# Patient Record
Sex: FEMALE | Race: OTHER | HISPANIC OR LATINO | URBAN - METROPOLITAN AREA
[De-identification: names, ages, dates, MRNs, and addresses within clinical notes are randomized per-mention and may not be internally consistent; named-entity substitution may affect disease eponyms.]

---

## 2017-04-06 ENCOUNTER — INPATIENT (INPATIENT)
Facility: HOSPITAL | Age: 82
LOS: 3 days | Discharge: HOME | DRG: 193 | End: 2017-04-10
Attending: INTERNAL MEDICINE | Admitting: INTERNAL MEDICINE
Payer: MEDICAID

## 2017-04-06 VITALS
WEIGHT: 115.08 LBS | TEMPERATURE: 98 F | HEIGHT: 63 IN | OXYGEN SATURATION: 77 % | RESPIRATION RATE: 20 BRPM | DIASTOLIC BLOOD PRESSURE: 52 MMHG | SYSTOLIC BLOOD PRESSURE: 138 MMHG | HEART RATE: 77 BPM

## 2017-04-06 DIAGNOSIS — J18.9 PNEUMONIA, UNSPECIFIED ORGANISM: ICD-10-CM

## 2017-04-06 DIAGNOSIS — E78.00 PURE HYPERCHOLESTEROLEMIA, UNSPECIFIED: ICD-10-CM

## 2017-04-06 DIAGNOSIS — Z41.8 ENCOUNTER FOR OTHER PROCEDURES FOR PURPOSES OTHER THAN REMEDYING HEALTH STATE: ICD-10-CM

## 2017-04-06 DIAGNOSIS — I10 ESSENTIAL (PRIMARY) HYPERTENSION: ICD-10-CM

## 2017-04-06 LAB
ALBUMIN SERPL ELPH-MCNC: 2 G/DL — LOW (ref 3.5–5)
ALP SERPL-CCNC: 114 U/L — SIGNIFICANT CHANGE UP (ref 40–120)
ALT FLD-CCNC: 55 U/L DA — SIGNIFICANT CHANGE UP (ref 10–60)
ANION GAP SERPL CALC-SCNC: 9 MMOL/L — SIGNIFICANT CHANGE UP (ref 5–17)
APTT BLD: 29.8 SEC — SIGNIFICANT CHANGE UP (ref 27.5–37.4)
AST SERPL-CCNC: 73 U/L — HIGH (ref 10–40)
BILIRUB SERPL-MCNC: 0.3 MG/DL — SIGNIFICANT CHANGE UP (ref 0.2–1.2)
BUN SERPL-MCNC: 17 MG/DL — SIGNIFICANT CHANGE UP (ref 7–18)
CALCIUM SERPL-MCNC: 8 MG/DL — LOW (ref 8.4–10.5)
CHLORIDE SERPL-SCNC: 101 MMOL/L — SIGNIFICANT CHANGE UP (ref 96–108)
CK MB BLD-MCNC: 1.9 % — SIGNIFICANT CHANGE UP (ref 0–3.5)
CK MB CFR SERPL CALC: 1.2 NG/ML — SIGNIFICANT CHANGE UP (ref 0–3.6)
CK SERPL-CCNC: 63 U/L — SIGNIFICANT CHANGE UP (ref 21–215)
CO2 SERPL-SCNC: 24 MMOL/L — SIGNIFICANT CHANGE UP (ref 22–31)
CREAT SERPL-MCNC: 0.94 MG/DL — SIGNIFICANT CHANGE UP (ref 0.5–1.3)
D DIMER BLD IA.RAPID-MCNC: 5187 NG/ML DDU — HIGH
GLUCOSE SERPL-MCNC: 123 MG/DL — HIGH (ref 70–99)
HCT VFR BLD CALC: 30.5 % — LOW (ref 34.5–45)
HGB BLD-MCNC: 10.3 G/DL — LOW (ref 11.5–15.5)
INR BLD: 1.65 RATIO — HIGH (ref 0.88–1.16)
LACTATE SERPL-SCNC: 2.1 MMOL/L — HIGH (ref 0.7–2)
LYMPHOCYTES # BLD AUTO: 6 % — LOW (ref 13–44)
MANUAL DIF COMMENT BLD-IMP: SIGNIFICANT CHANGE UP
MCHC RBC-ENTMCNC: 31.8 PG — SIGNIFICANT CHANGE UP (ref 27–34)
MCHC RBC-ENTMCNC: 33.8 GM/DL — SIGNIFICANT CHANGE UP (ref 32–36)
MCV RBC AUTO: 93.9 FL — SIGNIFICANT CHANGE UP (ref 80–100)
MONOCYTES NFR BLD AUTO: 8 % — SIGNIFICANT CHANGE UP (ref 2–14)
NEUTROPHILS NFR BLD AUTO: 82 % — HIGH (ref 43–77)
NEUTS BAND # BLD: 1 % — SIGNIFICANT CHANGE UP (ref 0–8)
NT-PROBNP SERPL-SCNC: 1543 PG/ML — HIGH (ref 0–450)
PLAT MORPH BLD: NORMAL — SIGNIFICANT CHANGE UP
PLATELET # BLD AUTO: 440 K/UL — HIGH (ref 150–400)
POLYCHROMASIA BLD QL SMEAR: SLIGHT — SIGNIFICANT CHANGE UP
POTASSIUM SERPL-MCNC: 4.5 MMOL/L — SIGNIFICANT CHANGE UP (ref 3.5–5.3)
POTASSIUM SERPL-SCNC: 4.5 MMOL/L — SIGNIFICANT CHANGE UP (ref 3.5–5.3)
PROT SERPL-MCNC: 6.4 G/DL — SIGNIFICANT CHANGE UP (ref 6–8.3)
PROTHROM AB SERPL-ACNC: 18.2 SEC — HIGH (ref 9.8–12.7)
RBC # BLD: 3.25 M/UL — LOW (ref 3.8–5.2)
RBC # FLD: 13.3 % — SIGNIFICANT CHANGE UP (ref 10.3–14.5)
RBC BLD AUTO: ABNORMAL
SODIUM SERPL-SCNC: 134 MMOL/L — LOW (ref 135–145)
TROPONIN I SERPL-MCNC: 0.02 NG/ML — SIGNIFICANT CHANGE UP (ref 0–0.04)
VARIANT LYMPHS # BLD: 3 % — SIGNIFICANT CHANGE UP (ref 0–6)
WBC # BLD: 19.5 K/UL — HIGH (ref 3.8–10.5)
WBC # FLD AUTO: 19.5 K/UL — HIGH (ref 3.8–10.5)

## 2017-04-06 PROCEDURE — 99291 CRITICAL CARE FIRST HOUR: CPT

## 2017-04-06 PROCEDURE — 71010: CPT | Mod: 26

## 2017-04-06 RX ORDER — AZITHROMYCIN 500 MG/1
500 TABLET, FILM COATED ORAL EVERY 24 HOURS
Qty: 0 | Refills: 0 | Status: DISCONTINUED | OUTPATIENT
Start: 2017-04-07 | End: 2017-04-10

## 2017-04-06 RX ORDER — SODIUM CHLORIDE 9 MG/ML
1000 INJECTION INTRAMUSCULAR; INTRAVENOUS; SUBCUTANEOUS
Qty: 0 | Refills: 0 | Status: DISCONTINUED | OUTPATIENT
Start: 2017-04-06 | End: 2017-04-07

## 2017-04-06 RX ORDER — SODIUM CHLORIDE 9 MG/ML
1000 INJECTION INTRAMUSCULAR; INTRAVENOUS; SUBCUTANEOUS
Qty: 0 | Refills: 0 | Status: DISCONTINUED | OUTPATIENT
Start: 2017-04-06 | End: 2017-04-06

## 2017-04-06 RX ORDER — ASPIRIN/CALCIUM CARB/MAGNESIUM 324 MG
81 TABLET ORAL DAILY
Qty: 0 | Refills: 0 | Status: DISCONTINUED | OUTPATIENT
Start: 2017-04-06 | End: 2017-04-10

## 2017-04-06 RX ORDER — ENOXAPARIN SODIUM 100 MG/ML
40 INJECTION SUBCUTANEOUS DAILY
Qty: 0 | Refills: 0 | Status: DISCONTINUED | OUTPATIENT
Start: 2017-04-06 | End: 2017-04-10

## 2017-04-06 RX ORDER — IPRATROPIUM BROMIDE 0.2 MG/ML
500 SOLUTION, NON-ORAL INHALATION
Qty: 0 | Refills: 0 | Status: COMPLETED | OUTPATIENT
Start: 2017-04-06 | End: 2017-04-06

## 2017-04-06 RX ORDER — CEFTRIAXONE 500 MG/1
1 INJECTION, POWDER, FOR SOLUTION INTRAMUSCULAR; INTRAVENOUS ONCE
Qty: 0 | Refills: 0 | Status: COMPLETED | OUTPATIENT
Start: 2017-04-06 | End: 2017-04-06

## 2017-04-06 RX ORDER — ATORVASTATIN CALCIUM 80 MG/1
80 TABLET, FILM COATED ORAL AT BEDTIME
Qty: 0 | Refills: 0 | Status: DISCONTINUED | OUTPATIENT
Start: 2017-04-06 | End: 2017-04-10

## 2017-04-06 RX ORDER — AZITHROMYCIN 500 MG/1
500 TABLET, FILM COATED ORAL ONCE
Qty: 0 | Refills: 0 | Status: COMPLETED | OUTPATIENT
Start: 2017-04-06 | End: 2017-04-06

## 2017-04-06 RX ORDER — ALBUTEROL 90 UG/1
2.5 AEROSOL, METERED ORAL
Qty: 0 | Refills: 0 | Status: COMPLETED | OUTPATIENT
Start: 2017-04-06 | End: 2017-04-06

## 2017-04-06 RX ORDER — CEFTRIAXONE 500 MG/1
1 INJECTION, POWDER, FOR SOLUTION INTRAMUSCULAR; INTRAVENOUS EVERY 24 HOURS
Qty: 0 | Refills: 0 | Status: DISCONTINUED | OUTPATIENT
Start: 2017-04-07 | End: 2017-04-10

## 2017-04-06 RX ADMIN — SODIUM CHLORIDE 500 MILLILITER(S): 9 INJECTION INTRAMUSCULAR; INTRAVENOUS; SUBCUTANEOUS at 20:43

## 2017-04-06 RX ADMIN — Medication 500 MICROGRAM(S): at 20:30

## 2017-04-06 RX ADMIN — ALBUTEROL 2.5 MILLIGRAM(S): 90 AEROSOL, METERED ORAL at 20:10

## 2017-04-06 RX ADMIN — ALBUTEROL 2.5 MILLIGRAM(S): 90 AEROSOL, METERED ORAL at 20:00

## 2017-04-06 RX ADMIN — ALBUTEROL 2.5 MILLIGRAM(S): 90 AEROSOL, METERED ORAL at 20:35

## 2017-04-06 RX ADMIN — Medication 500 MICROGRAM(S): at 20:10

## 2017-04-06 RX ADMIN — CEFTRIAXONE 100 GRAM(S): 500 INJECTION, POWDER, FOR SOLUTION INTRAMUSCULAR; INTRAVENOUS at 20:43

## 2017-04-06 RX ADMIN — Medication 500 MICROGRAM(S): at 20:00

## 2017-04-06 RX ADMIN — AZITHROMYCIN 250 MILLIGRAM(S): 500 TABLET, FILM COATED ORAL at 20:35

## 2017-04-06 RX ADMIN — Medication 125 MILLIGRAM(S): at 20:12

## 2017-04-06 NOTE — ED PROVIDER NOTE - NS ED MD SCRIBE ATTENDING SCRIBE SECTIONS
HIV/DISPOSITION/PAST MEDICAL/SURGICAL/SOCIAL HISTORY/HISTORY OF PRESENT ILLNESS/VITAL SIGNS( Pullset)/PHYSICAL EXAM/REVIEW OF SYSTEMS

## 2017-04-06 NOTE — H&P ADULT. - PROBLEM SELECTOR PLAN 2
Patient takes lisinopril 20mg Bid and Amlodipine 5mg Qd at home  will give lisinopril 20mg Qd for now as SBP is around 120  Monitor BP and will adjust the med accordingly

## 2017-04-06 NOTE — ED PROVIDER NOTE - PROGRESS NOTE DETAILS
Spoke w Dr Jeong, agree w admisison to ICU. Pt still tachypneic to 35-40, N3eqn-18% on NRB. Will admit

## 2017-04-06 NOTE — H&P ADULT. - PROBLEM SELECTOR PLAN 1
likely post viral PNA  CXR showed RLL infiltrate  Patient is afebrile and leucocytosis of 19.5  Continue Zithromax and Rocephin  Continue mild IV hydration  Will give high flow oxygen for SOB and hypoxia and will get ABG  will get RVP  f/up blood cultures  Will admit patient to ICU for monitoring and impending respiratory failure

## 2017-04-06 NOTE — ED PROVIDER NOTE - OBJECTIVE STATEMENT
80 y/o female with PMHx of Emphysema, LBBB, HLD, and HTN presents to the ED c/o difficulty breathing today with blood tinged sputum today. Granddaughter reports she took pt to urgent care 1 week ago and was Dx with flu by flu swab. The next day, pt was coughing blood and was seen at [facility], CXR indicated PNA. PMD Rx 5 days of Levofloxacin 750, finished 2 days ago. Since yesterday, pt with fatigue, pale, and hand swelling. Pt denies fever, chills, CP, or any other complaints. Allergies: Lopid (Hives). PMD: Dr. Agosto ([place]).

## 2017-04-06 NOTE — ED PROVIDER NOTE - MEDICAL DECISION MAKING DETAILS
Pt with cough x 1 week, failed outpt treatment. Possible emphysema lung fibrosis. Pt still hypoxic to 92-94, tachypneic on exam. Plan to r/o cardiac, will get CTA r/o PE vs infection. Will treat then admit.

## 2017-04-06 NOTE — ED PROVIDER NOTE - CRITICAL CARE PROVIDED
additional history taking/consult w/ pt's family directly relating to pts condition/telephone consultation with the patient's family/interpretation of diagnostic studies/documentation/direct patient care (not related to procedure)/consultation with other physicians

## 2017-04-06 NOTE — H&P ADULT. - HISTORY OF PRESENT ILLNESS
80 yo F from home with PMH of HTN, HLD, LBBB and recently diagnosed Emphysema presented with cough and shortness of breath. Patient is AAOx3 and [place] speaker and translated through grand daughter at the bedside. As per the patient and family member, patient started to have cough last wednesday and went to urgent care 82 yo F from home with PMH of HTN, HLD, LBBB and recently diagnosed Emphysema presented with cough and shortness of breath. Patient is AAOx3 and Singaporean speaker and translated through grand daughter at the bedside. As per the patient and family member, patient started to have cough last wednesday and went to urgent care and diagnosed as Flu, but because of her symptom was more than 48hrs, they didn't give her any flu treatment at that time. The next day, she went to ED for cough and hemoptysis, diagnosed as pneumonia and prescribed her levaquin for 5 days and she completed the abx 2 days ago. Today, she has cough with pink color sputum and shortness of breath and decided to come here. She complains about nausea but no vomiting. She has mild abdominal pain when she coughs. Denied other complaints.   In ED, patient is found to have tachypneic, hypoxic and ICU is consulted. 82 yo F from home with PMH of HTN, HLD, LBBB and recently diagnosed Emphysema presented with cough and shortness of breath. Patient is AAOx3 and Jordanian speaker and translated through grand daughter at the bedside. As per the patient and family member, patient started to have cough last wednesday and went to urgent care and diagnosed as Flu, but because of her symptom was more than 48hrs, they didn't give her any flu treatment at that time. The next day, she went to ED for cough and hemoptysis, diagnosed as pneumonia and prescribed her levaquin for 5 days and she completed the abx 2 days ago. Today, she has cough with pink color sputum and shortness of breath and decided to come here. She complains about nausea but no vomiting. She has mild abdominal pain when she coughs. Denied other complaints.   In ED, patient is found to have tachypneic, hypoxic and ICU is consulted.

## 2017-04-06 NOTE — H&P ADULT. - ASSESSMENT
80 yo F from home with PMH of HTN, HLD, LBBB and recently diagnosed Emphysema presented with cough and shortness of breath. Patient is AAOx3 and Tunisian speaker and translated through grand daughter at the bedside. As per the patient and family member, patient started to have cough last wednesday and went to urgent care and diagnosed as Flu, but because of her symptom was more than 48hrs, they didn't give her any flu treatment at that time. The next day, she went to ED for cough and hemoptysis, diagnosed as pneumonia and prescribed her levaquin for 5 days and she completed the abx 2 days ago. Today, she has cough with pink color sputum and shortness of breath and decided to come here. She complains about nausea but no vomiting. She has mild abdominal pain when she coughs. Denied other complaints.   In ED, patient is found to have tachypneic, hypoxic and ICU is consulted.

## 2017-04-07 LAB
24R-OH-CALCIDIOL SERPL-MCNC: 29.2 NG/ML — LOW (ref 30–100)
ALBUMIN SERPL ELPH-MCNC: 1.8 G/DL — LOW (ref 3.5–5)
ALP SERPL-CCNC: 95 U/L — SIGNIFICANT CHANGE UP (ref 40–120)
ALT FLD-CCNC: 44 U/L DA — SIGNIFICANT CHANGE UP (ref 10–60)
ANION GAP SERPL CALC-SCNC: 12 MMOL/L — SIGNIFICANT CHANGE UP (ref 5–17)
AST SERPL-CCNC: 52 U/L — HIGH (ref 10–40)
BASE EXCESS BLDA CALC-SCNC: -3.1 MMOL/L — LOW (ref -2–2)
BASOPHILS # BLD AUTO: 0 K/UL — SIGNIFICANT CHANGE UP (ref 0–0.2)
BASOPHILS NFR BLD AUTO: 0.1 % — SIGNIFICANT CHANGE UP (ref 0–2)
BILIRUB SERPL-MCNC: 0.2 MG/DL — SIGNIFICANT CHANGE UP (ref 0.2–1.2)
BLOOD GAS COMMENTS ARTERIAL: SIGNIFICANT CHANGE UP
BUN SERPL-MCNC: 13 MG/DL — SIGNIFICANT CHANGE UP (ref 7–18)
CALCIUM SERPL-MCNC: 7.8 MG/DL — LOW (ref 8.4–10.5)
CHLORIDE SERPL-SCNC: 102 MMOL/L — SIGNIFICANT CHANGE UP (ref 96–108)
CHOLEST SERPL-MCNC: 59 MG/DL — SIGNIFICANT CHANGE UP (ref 10–199)
CO2 SERPL-SCNC: 19 MMOL/L — LOW (ref 22–31)
CREAT SERPL-MCNC: 0.8 MG/DL — SIGNIFICANT CHANGE UP (ref 0.5–1.3)
EOSINOPHIL # BLD AUTO: 0 K/UL — SIGNIFICANT CHANGE UP (ref 0–0.5)
EOSINOPHIL NFR BLD AUTO: 0 % — SIGNIFICANT CHANGE UP (ref 0–6)
GLUCOSE SERPL-MCNC: 187 MG/DL — HIGH (ref 70–99)
HBA1C BLD-MCNC: 5.9 % — HIGH (ref 4–5.6)
HCO3 BLDA-SCNC: 19 MMOL/L — LOW (ref 23–27)
HCT VFR BLD CALC: 28.3 % — LOW (ref 34.5–45)
HDLC SERPL-MCNC: 14 MG/DL — LOW (ref 40–125)
HGB BLD-MCNC: 9.4 G/DL — LOW (ref 11.5–15.5)
HOROWITZ INDEX BLDA+IHG-RTO: 21 — SIGNIFICANT CHANGE UP
LEGIONELLA AG UR QL: NEGATIVE — SIGNIFICANT CHANGE UP
LIPID PNL WITH DIRECT LDL SERPL: 38 MG/DL — SIGNIFICANT CHANGE UP
LYMPHOCYTES # BLD AUTO: 0.3 K/UL — LOW (ref 1–3.3)
LYMPHOCYTES # BLD AUTO: 1.5 % — LOW (ref 13–44)
MAGNESIUM SERPL-MCNC: 2.4 MG/DL — SIGNIFICANT CHANGE UP (ref 1.8–2.4)
MCHC RBC-ENTMCNC: 31.5 PG — SIGNIFICANT CHANGE UP (ref 27–34)
MCHC RBC-ENTMCNC: 33.3 GM/DL — SIGNIFICANT CHANGE UP (ref 32–36)
MCV RBC AUTO: 94.6 FL — SIGNIFICANT CHANGE UP (ref 80–100)
MONOCYTES # BLD AUTO: 0.4 K/UL — SIGNIFICANT CHANGE UP (ref 0–0.9)
MONOCYTES NFR BLD AUTO: 1.8 % — LOW (ref 2–14)
NEUTROPHILS # BLD AUTO: 18.8 K/UL — HIGH (ref 1.8–7.4)
NEUTROPHILS NFR BLD AUTO: 96.6 % — HIGH (ref 43–77)
PCO2 BLDA: 25 MMHG — LOW (ref 32–46)
PH BLDA: 7.5 — HIGH (ref 7.35–7.45)
PHOSPHATE SERPL-MCNC: 2.5 MG/DL — SIGNIFICANT CHANGE UP (ref 2.5–4.5)
PLATELET # BLD AUTO: 428 K/UL — HIGH (ref 150–400)
PO2 BLDA: 159 MMHG — HIGH (ref 74–108)
POTASSIUM SERPL-MCNC: 4.3 MMOL/L — SIGNIFICANT CHANGE UP (ref 3.5–5.3)
POTASSIUM SERPL-SCNC: 4.3 MMOL/L — SIGNIFICANT CHANGE UP (ref 3.5–5.3)
PROT SERPL-MCNC: 5.8 G/DL — LOW (ref 6–8.3)
RAPID RVP RESULT: SIGNIFICANT CHANGE UP
RBC # BLD: 2.99 M/UL — LOW (ref 3.8–5.2)
RBC # FLD: 13.1 % — SIGNIFICANT CHANGE UP (ref 10.3–14.5)
SAO2 % BLDA: 99 % — HIGH (ref 92–96)
SODIUM SERPL-SCNC: 133 MMOL/L — LOW (ref 135–145)
TOTAL CHOLESTEROL/HDL RATIO MEASUREMENT: 4.2 RATIO — SIGNIFICANT CHANGE UP (ref 3.3–7.1)
TRIGL SERPL-MCNC: 36 MG/DL — SIGNIFICANT CHANGE UP (ref 10–149)
TSH SERPL-MCNC: 0.29 UU/ML — LOW (ref 0.34–4.82)
WBC # BLD: 19.5 K/UL — HIGH (ref 3.8–10.5)
WBC # FLD AUTO: 19.5 K/UL — HIGH (ref 3.8–10.5)

## 2017-04-07 PROCEDURE — 71010: CPT | Mod: 26

## 2017-04-07 RX ORDER — PANTOPRAZOLE SODIUM 20 MG/1
40 TABLET, DELAYED RELEASE ORAL
Qty: 0 | Refills: 0 | Status: DISCONTINUED | OUTPATIENT
Start: 2017-04-07 | End: 2017-04-10

## 2017-04-07 RX ORDER — IPRATROPIUM/ALBUTEROL SULFATE 18-103MCG
3 AEROSOL WITH ADAPTER (GRAM) INHALATION EVERY 6 HOURS
Qty: 0 | Refills: 0 | Status: DISCONTINUED | OUTPATIENT
Start: 2017-04-07 | End: 2017-04-10

## 2017-04-07 RX ADMIN — Medication 3 MILLILITER(S): at 09:30

## 2017-04-07 RX ADMIN — Medication 40 MILLIGRAM(S): at 20:54

## 2017-04-07 RX ADMIN — SODIUM CHLORIDE 75 MILLILITER(S): 9 INJECTION INTRAMUSCULAR; INTRAVENOUS; SUBCUTANEOUS at 01:30

## 2017-04-07 RX ADMIN — AZITHROMYCIN 250 MILLIGRAM(S): 500 TABLET, FILM COATED ORAL at 20:43

## 2017-04-07 RX ADMIN — Medication 20 MILLIGRAM(S): at 06:15

## 2017-04-07 RX ADMIN — Medication 40 MILLIGRAM(S): at 14:56

## 2017-04-07 RX ADMIN — Medication 3 MILLILITER(S): at 15:05

## 2017-04-07 RX ADMIN — CEFTRIAXONE 100 GRAM(S): 500 INJECTION, POWDER, FOR SOLUTION INTRAMUSCULAR; INTRAVENOUS at 20:13

## 2017-04-07 RX ADMIN — ATORVASTATIN CALCIUM 80 MILLIGRAM(S): 80 TABLET, FILM COATED ORAL at 20:54

## 2017-04-07 RX ADMIN — ENOXAPARIN SODIUM 40 MILLIGRAM(S): 100 INJECTION SUBCUTANEOUS at 11:49

## 2017-04-07 RX ADMIN — Medication 3 MILLILITER(S): at 20:24

## 2017-04-07 RX ADMIN — Medication 81 MILLIGRAM(S): at 11:49

## 2017-04-07 NOTE — ED ADULT NURSE NOTE - ED STAT RN HANDOFF DETAILS
pt.alert and  oriented x2. on % with 02 sat 93% ori lyle aware.admitted  to ICU for pneumonia of both lungs.transfer to ICU  report given to kary roberto.

## 2017-04-07 NOTE — PATIENT PROFILE ADULT. - ABILITY TO HEAR (WITH HEARING AID OR HEARING APPLIANCE IF NORMALLY USED):
Mildly to Moderately Impaired: difficulty hearing in some environments or speaker may need to increase volume or speak distinctly/uses left hearing aid she left at home

## 2017-04-08 LAB
ALBUMIN SERPL ELPH-MCNC: 1.8 G/DL — LOW (ref 3.5–5)
ALP SERPL-CCNC: 102 U/L — SIGNIFICANT CHANGE UP (ref 40–120)
ALT FLD-CCNC: 69 U/L DA — HIGH (ref 10–60)
ANION GAP SERPL CALC-SCNC: 11 MMOL/L — SIGNIFICANT CHANGE UP (ref 5–17)
AST SERPL-CCNC: 88 U/L — HIGH (ref 10–40)
BILIRUB SERPL-MCNC: 0.2 MG/DL — SIGNIFICANT CHANGE UP (ref 0.2–1.2)
BUN SERPL-MCNC: 20 MG/DL — HIGH (ref 7–18)
CALCIUM SERPL-MCNC: 7.9 MG/DL — LOW (ref 8.4–10.5)
CHLORIDE SERPL-SCNC: 103 MMOL/L — SIGNIFICANT CHANGE UP (ref 96–108)
CO2 SERPL-SCNC: 22 MMOL/L — SIGNIFICANT CHANGE UP (ref 22–31)
CREAT SERPL-MCNC: 0.75 MG/DL — SIGNIFICANT CHANGE UP (ref 0.5–1.3)
GLUCOSE SERPL-MCNC: 122 MG/DL — HIGH (ref 70–99)
HCT VFR BLD CALC: 29.6 % — LOW (ref 34.5–45)
HGB BLD-MCNC: 10.1 G/DL — LOW (ref 11.5–15.5)
MAGNESIUM SERPL-MCNC: 2.5 MG/DL — HIGH (ref 1.8–2.4)
MCHC RBC-ENTMCNC: 32.1 PG — SIGNIFICANT CHANGE UP (ref 27–34)
MCHC RBC-ENTMCNC: 34 GM/DL — SIGNIFICANT CHANGE UP (ref 32–36)
MCV RBC AUTO: 94.2 FL — SIGNIFICANT CHANGE UP (ref 80–100)
PHOSPHATE SERPL-MCNC: 3.4 MG/DL — SIGNIFICANT CHANGE UP (ref 2.5–4.5)
PLATELET # BLD AUTO: 547 K/UL — HIGH (ref 150–400)
POTASSIUM SERPL-MCNC: 4.2 MMOL/L — SIGNIFICANT CHANGE UP (ref 3.5–5.3)
POTASSIUM SERPL-SCNC: 4.2 MMOL/L — SIGNIFICANT CHANGE UP (ref 3.5–5.3)
PROT SERPL-MCNC: 5.9 G/DL — LOW (ref 6–8.3)
RBC # BLD: 3.14 M/UL — LOW (ref 3.8–5.2)
RBC # FLD: 13.3 % — SIGNIFICANT CHANGE UP (ref 10.3–14.5)
SODIUM SERPL-SCNC: 136 MMOL/L — SIGNIFICANT CHANGE UP (ref 135–145)
WBC # BLD: 27.2 K/UL — HIGH (ref 3.8–10.5)
WBC # FLD AUTO: 27.2 K/UL — HIGH (ref 3.8–10.5)

## 2017-04-08 RX ORDER — SENNA PLUS 8.6 MG/1
2 TABLET ORAL AT BEDTIME
Qty: 0 | Refills: 0 | Status: DISCONTINUED | OUTPATIENT
Start: 2017-04-08 | End: 2017-04-10

## 2017-04-08 RX ORDER — AMITRIPTYLINE HCL 25 MG
25 TABLET ORAL DAILY
Qty: 0 | Refills: 0 | Status: DISCONTINUED | OUTPATIENT
Start: 2017-04-08 | End: 2017-04-10

## 2017-04-08 RX ORDER — DOCUSATE SODIUM 100 MG
100 CAPSULE ORAL
Qty: 0 | Refills: 0 | Status: DISCONTINUED | OUTPATIENT
Start: 2017-04-08 | End: 2017-04-10

## 2017-04-08 RX ADMIN — ATORVASTATIN CALCIUM 80 MILLIGRAM(S): 80 TABLET, FILM COATED ORAL at 21:29

## 2017-04-08 RX ADMIN — Medication 3 MILLILITER(S): at 02:11

## 2017-04-08 RX ADMIN — AZITHROMYCIN 250 MILLIGRAM(S): 500 TABLET, FILM COATED ORAL at 21:29

## 2017-04-08 RX ADMIN — Medication 3 MILLILITER(S): at 17:38

## 2017-04-08 RX ADMIN — Medication 100 MILLIGRAM(S): at 17:42

## 2017-04-08 RX ADMIN — Medication 40 MILLIGRAM(S): at 06:34

## 2017-04-08 RX ADMIN — PANTOPRAZOLE SODIUM 40 MILLIGRAM(S): 20 TABLET, DELAYED RELEASE ORAL at 06:34

## 2017-04-08 RX ADMIN — CEFTRIAXONE 100 GRAM(S): 500 INJECTION, POWDER, FOR SOLUTION INTRAMUSCULAR; INTRAVENOUS at 21:29

## 2017-04-08 RX ADMIN — Medication 81 MILLIGRAM(S): at 12:16

## 2017-04-08 RX ADMIN — Medication 3 MILLILITER(S): at 20:51

## 2017-04-08 RX ADMIN — SENNA PLUS 2 TABLET(S): 8.6 TABLET ORAL at 21:29

## 2017-04-08 RX ADMIN — Medication 40 MILLIGRAM(S): at 12:16

## 2017-04-08 RX ADMIN — Medication 3 MILLILITER(S): at 09:56

## 2017-04-08 RX ADMIN — Medication 25 MILLIGRAM(S): at 12:16

## 2017-04-08 RX ADMIN — Medication 20 MILLIGRAM(S): at 06:33

## 2017-04-08 RX ADMIN — ENOXAPARIN SODIUM 40 MILLIGRAM(S): 100 INJECTION SUBCUTANEOUS at 12:16

## 2017-04-09 LAB
ALBUMIN SERPL ELPH-MCNC: 1.9 G/DL — LOW (ref 3.5–5)
ALP SERPL-CCNC: 105 U/L — SIGNIFICANT CHANGE UP (ref 40–120)
ALT FLD-CCNC: 82 U/L DA — HIGH (ref 10–60)
ANION GAP SERPL CALC-SCNC: 10 MMOL/L — SIGNIFICANT CHANGE UP (ref 5–17)
AST SERPL-CCNC: 80 U/L — HIGH (ref 10–40)
BILIRUB SERPL-MCNC: 0.3 MG/DL — SIGNIFICANT CHANGE UP (ref 0.2–1.2)
BUN SERPL-MCNC: 26 MG/DL — HIGH (ref 7–18)
CALCIUM SERPL-MCNC: 7.8 MG/DL — LOW (ref 8.4–10.5)
CHLORIDE SERPL-SCNC: 105 MMOL/L — SIGNIFICANT CHANGE UP (ref 96–108)
CO2 SERPL-SCNC: 22 MMOL/L — SIGNIFICANT CHANGE UP (ref 22–31)
CREAT SERPL-MCNC: 0.86 MG/DL — SIGNIFICANT CHANGE UP (ref 0.5–1.3)
GLUCOSE SERPL-MCNC: 107 MG/DL — HIGH (ref 70–99)
GRAM STN FLD: SIGNIFICANT CHANGE UP
HCT VFR BLD CALC: 33.8 % — LOW (ref 34.5–45)
HGB BLD-MCNC: 11.5 G/DL — SIGNIFICANT CHANGE UP (ref 11.5–15.5)
MAGNESIUM SERPL-MCNC: 2.8 MG/DL — HIGH (ref 1.8–2.4)
MCHC RBC-ENTMCNC: 31.9 PG — SIGNIFICANT CHANGE UP (ref 27–34)
MCHC RBC-ENTMCNC: 34.1 GM/DL — SIGNIFICANT CHANGE UP (ref 32–36)
MCV RBC AUTO: 93.5 FL — SIGNIFICANT CHANGE UP (ref 80–100)
MRSA PCR RESULT.: DETECTED
PHOSPHATE SERPL-MCNC: 3.4 MG/DL — SIGNIFICANT CHANGE UP (ref 2.5–4.5)
PLATELET # BLD AUTO: 644 K/UL — HIGH (ref 150–400)
POTASSIUM SERPL-MCNC: 4.4 MMOL/L — SIGNIFICANT CHANGE UP (ref 3.5–5.3)
POTASSIUM SERPL-SCNC: 4.4 MMOL/L — SIGNIFICANT CHANGE UP (ref 3.5–5.3)
PROT SERPL-MCNC: 6 G/DL — SIGNIFICANT CHANGE UP (ref 6–8.3)
RBC # BLD: 3.61 M/UL — LOW (ref 3.8–5.2)
RBC # FLD: 13.5 % — SIGNIFICANT CHANGE UP (ref 10.3–14.5)
S AUREUS DNA NOSE QL NAA+PROBE: DETECTED
SODIUM SERPL-SCNC: 137 MMOL/L — SIGNIFICANT CHANGE UP (ref 135–145)
SPECIMEN SOURCE: SIGNIFICANT CHANGE UP
WBC # BLD: 21.2 K/UL — HIGH (ref 3.8–10.5)
WBC # FLD AUTO: 21.2 K/UL — HIGH (ref 3.8–10.5)

## 2017-04-09 PROCEDURE — 71010: CPT | Mod: 26

## 2017-04-09 RX ORDER — POLYETHYLENE GLYCOL 3350 17 G/17G
17 POWDER, FOR SOLUTION ORAL ONCE
Qty: 0 | Refills: 0 | Status: COMPLETED | OUTPATIENT
Start: 2017-04-09 | End: 2017-04-09

## 2017-04-09 RX ADMIN — Medication 81 MILLIGRAM(S): at 11:33

## 2017-04-09 RX ADMIN — PANTOPRAZOLE SODIUM 40 MILLIGRAM(S): 20 TABLET, DELAYED RELEASE ORAL at 05:11

## 2017-04-09 RX ADMIN — CEFTRIAXONE 100 GRAM(S): 500 INJECTION, POWDER, FOR SOLUTION INTRAMUSCULAR; INTRAVENOUS at 22:22

## 2017-04-09 RX ADMIN — Medication 100 MILLIGRAM(S): at 05:11

## 2017-04-09 RX ADMIN — SENNA PLUS 2 TABLET(S): 8.6 TABLET ORAL at 22:07

## 2017-04-09 RX ADMIN — Medication 3 MILLILITER(S): at 14:54

## 2017-04-09 RX ADMIN — AZITHROMYCIN 250 MILLIGRAM(S): 500 TABLET, FILM COATED ORAL at 22:22

## 2017-04-09 RX ADMIN — POLYETHYLENE GLYCOL 3350 17 GRAM(S): 17 POWDER, FOR SOLUTION ORAL at 11:32

## 2017-04-09 RX ADMIN — Medication 3 MILLILITER(S): at 21:13

## 2017-04-09 RX ADMIN — ENOXAPARIN SODIUM 40 MILLIGRAM(S): 100 INJECTION SUBCUTANEOUS at 11:34

## 2017-04-09 RX ADMIN — Medication 3 MILLILITER(S): at 09:01

## 2017-04-09 RX ADMIN — Medication 100 MILLIGRAM(S): at 17:33

## 2017-04-09 RX ADMIN — ATORVASTATIN CALCIUM 80 MILLIGRAM(S): 80 TABLET, FILM COATED ORAL at 22:07

## 2017-04-09 RX ADMIN — Medication 20 MILLIGRAM(S): at 05:11

## 2017-04-09 RX ADMIN — Medication 25 MILLIGRAM(S): at 11:33

## 2017-04-09 RX ADMIN — Medication 40 MILLIGRAM(S): at 05:11

## 2017-04-10 VITALS
TEMPERATURE: 97 F | RESPIRATION RATE: 19 BRPM | HEART RATE: 69 BPM | DIASTOLIC BLOOD PRESSURE: 56 MMHG | OXYGEN SATURATION: 95 % | SYSTOLIC BLOOD PRESSURE: 123 MMHG

## 2017-04-10 PROCEDURE — 93306 TTE W/DOPPLER COMPLETE: CPT

## 2017-04-10 PROCEDURE — 85027 COMPLETE CBC AUTOMATED: CPT

## 2017-04-10 PROCEDURE — 84484 ASSAY OF TROPONIN QUANT: CPT

## 2017-04-10 PROCEDURE — 99291 CRITICAL CARE FIRST HOUR: CPT | Mod: 25

## 2017-04-10 PROCEDURE — 83880 ASSAY OF NATRIURETIC PEPTIDE: CPT

## 2017-04-10 PROCEDURE — 82553 CREATINE MB FRACTION: CPT

## 2017-04-10 PROCEDURE — 84443 ASSAY THYROID STIM HORMONE: CPT

## 2017-04-10 PROCEDURE — 85379 FIBRIN DEGRADATION QUANT: CPT

## 2017-04-10 PROCEDURE — 87449 NOS EACH ORGANISM AG IA: CPT

## 2017-04-10 PROCEDURE — 94640 AIRWAY INHALATION TREATMENT: CPT

## 2017-04-10 PROCEDURE — 71010: CPT | Mod: 26

## 2017-04-10 PROCEDURE — 82306 VITAMIN D 25 HYDROXY: CPT

## 2017-04-10 PROCEDURE — 83605 ASSAY OF LACTIC ACID: CPT

## 2017-04-10 PROCEDURE — 87040 BLOOD CULTURE FOR BACTERIA: CPT

## 2017-04-10 PROCEDURE — 87070 CULTURE OTHR SPECIMN AEROBIC: CPT

## 2017-04-10 PROCEDURE — 71045 X-RAY EXAM CHEST 1 VIEW: CPT

## 2017-04-10 PROCEDURE — 93005 ELECTROCARDIOGRAM TRACING: CPT

## 2017-04-10 PROCEDURE — 85730 THROMBOPLASTIN TIME PARTIAL: CPT

## 2017-04-10 PROCEDURE — 80053 COMPREHEN METABOLIC PANEL: CPT

## 2017-04-10 PROCEDURE — 82803 BLOOD GASES ANY COMBINATION: CPT

## 2017-04-10 PROCEDURE — 94760 N-INVAS EAR/PLS OXIMETRY 1: CPT

## 2017-04-10 PROCEDURE — 84100 ASSAY OF PHOSPHORUS: CPT

## 2017-04-10 PROCEDURE — 87633 RESP VIRUS 12-25 TARGETS: CPT

## 2017-04-10 PROCEDURE — 97163 PT EVAL HIGH COMPLEX 45 MIN: CPT

## 2017-04-10 PROCEDURE — 87798 DETECT AGENT NOS DNA AMP: CPT

## 2017-04-10 PROCEDURE — 85610 PROTHROMBIN TIME: CPT

## 2017-04-10 PROCEDURE — 82550 ASSAY OF CK (CPK): CPT

## 2017-04-10 PROCEDURE — 87486 CHLMYD PNEUM DNA AMP PROBE: CPT

## 2017-04-10 PROCEDURE — 96374 THER/PROPH/DIAG INJ IV PUSH: CPT

## 2017-04-10 PROCEDURE — 87641 MR-STAPH DNA AMP PROBE: CPT

## 2017-04-10 PROCEDURE — 83036 HEMOGLOBIN GLYCOSYLATED A1C: CPT

## 2017-04-10 PROCEDURE — 83735 ASSAY OF MAGNESIUM: CPT

## 2017-04-10 PROCEDURE — 99285 EMERGENCY DEPT VISIT HI MDM: CPT | Mod: 25

## 2017-04-10 PROCEDURE — 87640 STAPH A DNA AMP PROBE: CPT

## 2017-04-10 PROCEDURE — 87581 M.PNEUMON DNA AMP PROBE: CPT

## 2017-04-10 PROCEDURE — 96375 TX/PRO/DX INJ NEW DRUG ADDON: CPT

## 2017-04-10 PROCEDURE — 80061 LIPID PANEL: CPT

## 2017-04-10 RX ORDER — CEFOXITIN 1 G/1
1 INJECTION, POWDER, FOR SOLUTION INTRAVENOUS
Qty: 14 | Refills: 0 | OUTPATIENT
Start: 2017-04-10 | End: 2017-04-17

## 2017-04-10 RX ORDER — AZITHROMYCIN 500 MG/1
500 TABLET, FILM COATED ORAL EVERY 24 HOURS
Qty: 0 | Refills: 0 | Status: DISCONTINUED | OUTPATIENT
Start: 2017-04-10 | End: 2017-04-10

## 2017-04-10 RX ORDER — IPRATROPIUM/ALBUTEROL SULFATE 18-103MCG
3 AEROSOL WITH ADAPTER (GRAM) INHALATION
Qty: 1 | Refills: 0 | OUTPATIENT
Start: 2017-04-10 | End: 2017-05-10

## 2017-04-10 RX ORDER — CEFTRIAXONE 500 MG/1
1 INJECTION, POWDER, FOR SOLUTION INTRAMUSCULAR; INTRAVENOUS EVERY 24 HOURS
Qty: 0 | Refills: 0 | Status: DISCONTINUED | OUTPATIENT
Start: 2017-04-10 | End: 2017-04-10

## 2017-04-10 RX ORDER — SODIUM CHLORIDE 9 MG/ML
1000 INJECTION INTRAMUSCULAR; INTRAVENOUS; SUBCUTANEOUS
Qty: 0 | Refills: 0 | Status: DISCONTINUED | OUTPATIENT
Start: 2017-04-10 | End: 2017-04-10

## 2017-04-10 RX ORDER — PANTOPRAZOLE SODIUM 20 MG/1
1 TABLET, DELAYED RELEASE ORAL
Qty: 14 | Refills: 0 | OUTPATIENT
Start: 2017-04-10 | End: 2017-04-24

## 2017-04-10 RX ORDER — AMITRIPTYLINE HCL 25 MG
1 TABLET ORAL
Qty: 30 | Refills: 0 | OUTPATIENT
Start: 2017-04-10 | End: 2017-05-10

## 2017-04-10 RX ORDER — MAGNESIUM SULFATE 500 MG/ML
1 VIAL (ML) INJECTION ONCE
Qty: 0 | Refills: 0 | Status: DISCONTINUED | OUTPATIENT
Start: 2017-04-10 | End: 2017-04-10

## 2017-04-10 RX ORDER — CEFUROXIME AXETIL 250 MG
500 TABLET ORAL ONCE
Qty: 0 | Refills: 0 | Status: COMPLETED | OUTPATIENT
Start: 2017-04-10 | End: 2017-04-10

## 2017-04-10 RX ADMIN — ENOXAPARIN SODIUM 40 MILLIGRAM(S): 100 INJECTION SUBCUTANEOUS at 13:17

## 2017-04-10 RX ADMIN — Medication 20 MILLIGRAM(S): at 06:12

## 2017-04-10 RX ADMIN — Medication 40 MILLIGRAM(S): at 06:12

## 2017-04-10 RX ADMIN — Medication 81 MILLIGRAM(S): at 13:17

## 2017-04-10 RX ADMIN — Medication 500 MILLIGRAM(S): at 19:38

## 2017-04-10 RX ADMIN — Medication 100 MILLIGRAM(S): at 18:34

## 2017-04-10 RX ADMIN — Medication 3 MILLILITER(S): at 14:15

## 2017-04-10 RX ADMIN — Medication 3 MILLILITER(S): at 08:50

## 2017-04-10 RX ADMIN — PANTOPRAZOLE SODIUM 40 MILLIGRAM(S): 20 TABLET, DELAYED RELEASE ORAL at 06:12

## 2017-04-10 RX ADMIN — Medication 100 MILLIGRAM(S): at 06:12

## 2017-04-10 NOTE — DISCHARGE NOTE ADULT - MEDICATION SUMMARY - MEDICATIONS TO TAKE
I will START or STAY ON the medications listed below when I get home from the hospital:    predniSONE 20 mg oral tablet  -- 1.5 tab(s) by mouth once a day for 3 days then 1 tab for 3 days then 0.5 tab for 3 days then discontinue  -- Indication: For steroid taper for pna    aspirin  -- 100 milligram(s) by mouth once a day  -- Indication: For Ppx    enalapril 20 mg oral tablet  -- 1 tab(s) by mouth once a day  -- Indication: For HTN (hypertension)    amitriptyline 25 mg oral tablet  -- 1 tab(s) by mouth once a day  -- Indication: For depression    meclizine 12.5 mg oral tablet  -- 1 tab(s) by mouth every 8 hours, As needed, Dizziness  -- Indication: For dizziness    Crestor 20 mg oral tablet  -- 1 tab(s) by mouth once a day (at bedtime)  -- Indication: For HLD    amLODIPine  -- 5 milligram(s) by mouth once a day  -- Indication: For HTN (hypertension)    Ceftin 500 mg oral tablet  -- 1 tab(s) by mouth 2 times a day  -- Finish all this medication unless otherwise directed by prescriber.  Medication should be taken with plenty of water.  Take with food or milk.    -- Indication: For CAP (community acquired pneumonia)    pantoprazole 40 mg oral delayed release tablet  -- 1 tab(s) by mouth once a day (before a meal)  -- Indication: For Ppx    Drisdol 50,000 intl units (1.25 mg) oral capsule  -- 1 cap(s) by mouth once a week for 6 weeks  -- Indication: For vitamin d deficiency I will START or STAY ON the medications listed below when I get home from the hospital:    walker  -- Indication: For fall    predniSONE 20 mg oral tablet  -- 1.5 tab(s) by mouth once a day for 3 days then 1 tab for 3 days then 0.5 tab for 3 days then discontinue  -- Indication: For steroid taper for pna    aspirin  -- 100 milligram(s) by mouth once a day  -- Indication: For Ppx    enalapril 20 mg oral tablet  -- 1 tab(s) by mouth once a day  -- Indication: For HTN (hypertension)    amitriptyline 25 mg oral tablet  -- 1 tab(s) by mouth once a day  -- Indication: For depression    meclizine 12.5 mg oral tablet  -- 1 tab(s) by mouth every 8 hours, As needed, Dizziness  -- Indication: For dizziness    Crestor 20 mg oral tablet  -- 1 tab(s) by mouth once a day (at bedtime)  -- Indication: For HLD    amLODIPine  -- 5 milligram(s) by mouth once a day  -- Indication: For HTN (hypertension)    Ceftin 500 mg oral tablet  -- 1 tab(s) by mouth 2 times a day  -- Finish all this medication unless otherwise directed by prescriber.  Medication should be taken with plenty of water.  Take with food or milk.    -- Indication: For CAP (community acquired pneumonia)    pantoprazole 40 mg oral delayed release tablet  -- 1 tab(s) by mouth once a day (before a meal)  -- Indication: For Ppx    Drisdol 50,000 intl units (1.25 mg) oral capsule  -- 1 cap(s) by mouth once a week for 6 weeks  -- Indication: For vitamin d deficiency I will START or STAY ON the medications listed below when I get home from the hospital:    walker  -- Indication: For fall    rolling walker  -- 1 unit(s)  -- Indication: For fall    nebulizer machines  -- 1 unit(s) inhaled every 6 hours, As Needed  -- Indication: For shortness of breath    predniSONE 20 mg oral tablet  -- 1.5 tab(s) by mouth once a day for 3 days then 1 tab for 3 days then 0.5 tab for 3 days then discontinue  -- Indication: For steroid taper for pna    aspirin  -- 100 milligram(s) by mouth once a day  -- Indication: For Ppx    enalapril 20 mg oral tablet  -- 1 tab(s) by mouth once a day  -- Indication: For HTN (hypertension)    amitriptyline 25 mg oral tablet  -- 1 tab(s) by mouth once a day  -- Indication: For depression    meclizine 12.5 mg oral tablet  -- 1 tab(s) by mouth every 8 hours, As needed, Dizziness  -- Indication: For dizziness    Crestor 20 mg oral tablet  -- 1 tab(s) by mouth once a day (at bedtime)  -- Indication: For HLD    DuoNeb 0.5 mg-2.5 mg/3 mL inhalation solution  -- 3 milliliter(s) inhaled every 6 hours, As Needed  -- For inhalation only.  It is very important that you take or use this exactly as directed.  Do not skip doses or discontinue unless directed by your doctor.  Obtain medical advice before taking any non-prescription drugs as some may affect the action of this medication.    -- Indication: For shortness of breath    amLODIPine  -- 5 milligram(s) by mouth once a day  -- Indication: For HTN (hypertension)    Ceftin 500 mg oral tablet  -- 1 tab(s) by mouth 2 times a day  -- Finish all this medication unless otherwise directed by prescriber.  Medication should be taken with plenty of water.  Take with food or milk.    -- Indication: For CAP (community acquired pneumonia)    pantoprazole 40 mg oral delayed release tablet  -- 1 tab(s) by mouth once a day (before a meal)  -- Indication: For Ppx    Drisdol 50,000 intl units (1.25 mg) oral capsule  -- 1 cap(s) by mouth once a week for 6 weeks  -- Indication: For vitamin d deficiency

## 2017-04-10 NOTE — DISCHARGE NOTE ADULT - CARE PLAN
Principal Discharge DX:	CAP (community acquired pneumonia)  Goal:	treat infection and abx taper  Instructions for follow-up, activity and diet:	c/w ceftin as advised for 7 days, taper prednisone as ordered  Secondary Diagnosis:	Essential hypertension  Instructions for follow-up, activity and diet:	c/w norvasc  Secondary Diagnosis:	Hypercholesteremia  Instructions for follow-up, activity and diet:	c/w crestor

## 2017-04-10 NOTE — DISCHARGE NOTE ADULT - PLAN OF CARE
treat infection and abx taper c/w ceftin as advised for 7 days, taper prednisone as ordered c/w norvasc c/w crestor

## 2017-04-10 NOTE — DISCHARGE NOTE ADULT - HOSPITAL COURSE
80 yo F from home with PMH of HTN, HLD, LBBB and recently diagnosed Emphysema presented with cough and shortness of breath. she was admitted to ICU for concerning with respiratory failure and sepsis 2/2 PNA    Admitted to ICU for    1) sepsis 2/2 PNA  CXR showed RLL infiltrate --> B/L congestion  Patient is afebrile and leucocytosis of 19.5 --> 71--> 21  completed  Zithromax and Rocephin as per ID - Dr. Messer  she was on  high flow oxygen for SOB, later discontinued  viral panel neg. blood cultures neg  c/w po prednisone taper, will need ceftin x 7 days    regular diet    2) HTN (hypertension)  continue with vasotec 20mg  andhome norvasc 5mg as needed    3) HLD- c/w statin    Patient medically ready for discharge today per attending.

## 2017-04-10 NOTE — DISCHARGE NOTE ADULT - CARE PROVIDER_API CALL
Fermin Tatum), Internal Medicine; Pulmonary Disease  [id number] [address] [place] [address]  Phone: [phone]  Fax: [phone]

## 2017-04-10 NOTE — DISCHARGE NOTE ADULT - PATIENT PORTAL LINK FT
“You can access the [facility] Patient Portal, offered by [facility], by registering with the following website: [email or web address]”

## 2017-04-10 NOTE — PHYSICAL THERAPY INITIAL EVALUATION ADULT - DIAGNOSIS, PT EVAL
Patient presented with SOB, decreased endurance, spo2 at rest was 86 without supplemental oxygen,slightly unsteady gait.

## 2017-04-11 LAB
CULTURE RESULTS: SIGNIFICANT CHANGE UP
SPECIMEN SOURCE: SIGNIFICANT CHANGE UP

## 2017-04-12 LAB
CULTURE RESULTS: SIGNIFICANT CHANGE UP
CULTURE RESULTS: SIGNIFICANT CHANGE UP
SPECIMEN SOURCE: SIGNIFICANT CHANGE UP
SPECIMEN SOURCE: SIGNIFICANT CHANGE UP

## 2017-04-13 DIAGNOSIS — J18.9 PNEUMONIA, UNSPECIFIED ORGANISM: ICD-10-CM

## 2017-04-13 DIAGNOSIS — Z79.82 LONG TERM (CURRENT) USE OF ASPIRIN: ICD-10-CM

## 2017-04-13 DIAGNOSIS — Z82.49 FAMILY HISTORY OF ISCHEMIC HEART DISEASE AND OTHER DISEASES OF THE CIRCULATORY SYSTEM: ICD-10-CM

## 2017-04-13 DIAGNOSIS — J96.01 ACUTE RESPIRATORY FAILURE WITH HYPOXIA: ICD-10-CM

## 2017-04-13 DIAGNOSIS — J43.9 EMPHYSEMA, UNSPECIFIED: ICD-10-CM

## 2017-04-13 DIAGNOSIS — I44.7 LEFT BUNDLE-BRANCH BLOCK, UNSPECIFIED: ICD-10-CM

## 2017-04-13 DIAGNOSIS — I10 ESSENTIAL (PRIMARY) HYPERTENSION: ICD-10-CM

## 2017-04-13 DIAGNOSIS — E78.00 PURE HYPERCHOLESTEROLEMIA, UNSPECIFIED: ICD-10-CM

## 2017-04-13 DIAGNOSIS — R06.02 SHORTNESS OF BREATH: ICD-10-CM

## 2017-04-13 DIAGNOSIS — Z88.8 ALLERGY STATUS TO OTHER DRUGS, MEDICAMENTS AND BIOLOGICAL SUBSTANCES STATUS: ICD-10-CM

## 2018-06-24 NOTE — ED PROVIDER NOTE - CPE EDP SKIN NORM
PT A/OX4. C/C ALLERGIC REACTION TO SHRIMP X 1 HOUR AGO. NEG ACUTE DISTRESS. 
VSS. STABLE CONDITION. NEG SIGNS OF SOB. SAFETY MEASURES IN PLACE.
normal...

## 2018-07-23 ENCOUNTER — INPATIENT (INPATIENT)
Facility: HOSPITAL | Age: 83
LOS: 1 days | Discharge: HOME | DRG: 641 | End: 2018-07-25
Attending: GENERAL PRACTICE | Admitting: GENERAL PRACTICE
Payer: MEDICAID

## 2018-07-23 VITALS
OXYGEN SATURATION: 97 % | HEART RATE: 60 BPM | SYSTOLIC BLOOD PRESSURE: 145 MMHG | RESPIRATION RATE: 18 BRPM | TEMPERATURE: 98 F | DIASTOLIC BLOOD PRESSURE: 66 MMHG

## 2018-07-23 DIAGNOSIS — D64.9 ANEMIA, UNSPECIFIED: ICD-10-CM

## 2018-07-23 DIAGNOSIS — E87.1 HYPO-OSMOLALITY AND HYPONATREMIA: ICD-10-CM

## 2018-07-23 DIAGNOSIS — E78.00 PURE HYPERCHOLESTEROLEMIA, UNSPECIFIED: ICD-10-CM

## 2018-07-23 DIAGNOSIS — R53.83 OTHER FATIGUE: ICD-10-CM

## 2018-07-23 DIAGNOSIS — Z29.9 ENCOUNTER FOR PROPHYLACTIC MEASURES, UNSPECIFIED: ICD-10-CM

## 2018-07-23 DIAGNOSIS — I10 ESSENTIAL (PRIMARY) HYPERTENSION: ICD-10-CM

## 2018-07-23 PROBLEM — I44.7 LEFT BUNDLE-BRANCH BLOCK, UNSPECIFIED: Chronic | Status: ACTIVE | Noted: 2017-04-06

## 2018-07-23 LAB
ALBUMIN SERPL ELPH-MCNC: 3.6 G/DL — SIGNIFICANT CHANGE UP (ref 3.5–5)
ALP SERPL-CCNC: 63 U/L — SIGNIFICANT CHANGE UP (ref 40–120)
ALT FLD-CCNC: 23 U/L DA — SIGNIFICANT CHANGE UP (ref 10–60)
ANION GAP SERPL CALC-SCNC: 7 MMOL/L — SIGNIFICANT CHANGE UP (ref 5–17)
APPEARANCE UR: CLEAR — SIGNIFICANT CHANGE UP
AST SERPL-CCNC: 28 U/L — SIGNIFICANT CHANGE UP (ref 10–40)
BASOPHILS # BLD AUTO: 0.1 K/UL — SIGNIFICANT CHANGE UP (ref 0–0.2)
BASOPHILS NFR BLD AUTO: 2 % — SIGNIFICANT CHANGE UP (ref 0–2)
BILIRUB SERPL-MCNC: 0.4 MG/DL — SIGNIFICANT CHANGE UP (ref 0.2–1.2)
BILIRUB UR-MCNC: NEGATIVE — SIGNIFICANT CHANGE UP
BUN SERPL-MCNC: 19 MG/DL — HIGH (ref 7–18)
CALCIUM SERPL-MCNC: 8.6 MG/DL — SIGNIFICANT CHANGE UP (ref 8.4–10.5)
CHLORIDE SERPL-SCNC: 96 MMOL/L — SIGNIFICANT CHANGE UP (ref 96–108)
CO2 SERPL-SCNC: 24 MMOL/L — SIGNIFICANT CHANGE UP (ref 22–31)
COLOR SPEC: YELLOW — SIGNIFICANT CHANGE UP
CREAT ?TM UR-MCNC: <13 MG/DL — SIGNIFICANT CHANGE UP
CREAT SERPL-MCNC: 0.96 MG/DL — SIGNIFICANT CHANGE UP (ref 0.5–1.3)
DIFF PNL FLD: NEGATIVE — SIGNIFICANT CHANGE UP
EOSINOPHIL # BLD AUTO: 0 K/UL — SIGNIFICANT CHANGE UP (ref 0–0.5)
EOSINOPHIL NFR BLD AUTO: 1 % — SIGNIFICANT CHANGE UP (ref 0–6)
GLUCOSE SERPL-MCNC: 95 MG/DL — SIGNIFICANT CHANGE UP (ref 70–99)
GLUCOSE UR QL: NEGATIVE — SIGNIFICANT CHANGE UP
HCT VFR BLD CALC: 32.4 % — LOW (ref 34.5–45)
HGB BLD-MCNC: 11.3 G/DL — LOW (ref 11.5–15.5)
KETONES UR-MCNC: NEGATIVE — SIGNIFICANT CHANGE UP
LEUKOCYTE ESTERASE UR-ACNC: NEGATIVE — SIGNIFICANT CHANGE UP
LYMPHOCYTES # BLD AUTO: 1 K/UL — SIGNIFICANT CHANGE UP (ref 1–3.3)
LYMPHOCYTES # BLD AUTO: 25.2 % — SIGNIFICANT CHANGE UP (ref 13–44)
MCHC RBC-ENTMCNC: 31.2 PG — SIGNIFICANT CHANGE UP (ref 27–34)
MCHC RBC-ENTMCNC: 34.8 GM/DL — SIGNIFICANT CHANGE UP (ref 32–36)
MCV RBC AUTO: 89.7 FL — SIGNIFICANT CHANGE UP (ref 80–100)
MONOCYTES # BLD AUTO: 0.6 K/UL — SIGNIFICANT CHANGE UP (ref 0–0.9)
MONOCYTES NFR BLD AUTO: 16.1 % — HIGH (ref 2–14)
NEUTROPHILS # BLD AUTO: 2.2 K/UL — SIGNIFICANT CHANGE UP (ref 1.8–7.4)
NEUTROPHILS NFR BLD AUTO: 55.7 % — SIGNIFICANT CHANGE UP (ref 43–77)
NITRITE UR-MCNC: NEGATIVE — SIGNIFICANT CHANGE UP
OSMOLALITY UR: 126 MOS/KG — SIGNIFICANT CHANGE UP (ref 50–1200)
PH UR: 7 — SIGNIFICANT CHANGE UP (ref 5–8)
PLATELET # BLD AUTO: 195 K/UL — SIGNIFICANT CHANGE UP (ref 150–400)
POTASSIUM SERPL-MCNC: 4.6 MMOL/L — SIGNIFICANT CHANGE UP (ref 3.5–5.3)
POTASSIUM SERPL-SCNC: 4.6 MMOL/L — SIGNIFICANT CHANGE UP (ref 3.5–5.3)
POTASSIUM UR-SCNC: 11 MMOL/L — LOW (ref 25–125)
PROT ?TM UR-MCNC: <5 MG/DL — SIGNIFICANT CHANGE UP (ref 0–12)
PROT SERPL-MCNC: 7 G/DL — SIGNIFICANT CHANGE UP (ref 6–8.3)
PROT UR-MCNC: NEGATIVE — SIGNIFICANT CHANGE UP
RBC # BLD: 3.61 M/UL — LOW (ref 3.8–5.2)
RBC # FLD: 12.6 % — SIGNIFICANT CHANGE UP (ref 10.3–14.5)
SODIUM SERPL-SCNC: 127 MMOL/L — LOW (ref 135–145)
SODIUM UR-SCNC: 25 MMOL/L — LOW (ref 40–220)
SP GR SPEC: 1.01 — SIGNIFICANT CHANGE UP (ref 1.01–1.02)
UROBILINOGEN FLD QL: NEGATIVE — SIGNIFICANT CHANGE UP
WBC # BLD: 3.9 K/UL — SIGNIFICANT CHANGE UP (ref 3.8–10.5)
WBC # FLD AUTO: 3.9 K/UL — SIGNIFICANT CHANGE UP (ref 3.8–10.5)

## 2018-07-23 PROCEDURE — 70450 CT HEAD/BRAIN W/O DYE: CPT | Mod: 26

## 2018-07-23 PROCEDURE — 99285 EMERGENCY DEPT VISIT HI MDM: CPT

## 2018-07-23 RX ORDER — ENOXAPARIN SODIUM 100 MG/ML
30 INJECTION SUBCUTANEOUS DAILY
Qty: 0 | Refills: 0 | Status: DISCONTINUED | OUTPATIENT
Start: 2018-07-23 | End: 2018-07-25

## 2018-07-23 RX ORDER — AMLODIPINE BESYLATE 2.5 MG/1
5 TABLET ORAL DAILY
Qty: 0 | Refills: 0 | Status: DISCONTINUED | OUTPATIENT
Start: 2018-07-23 | End: 2018-07-25

## 2018-07-23 RX ORDER — ATORVASTATIN CALCIUM 80 MG/1
40 TABLET, FILM COATED ORAL AT BEDTIME
Qty: 0 | Refills: 0 | Status: DISCONTINUED | OUTPATIENT
Start: 2018-07-23 | End: 2018-07-25

## 2018-07-23 RX ORDER — SODIUM CHLORIDE 9 MG/ML
1000 INJECTION INTRAMUSCULAR; INTRAVENOUS; SUBCUTANEOUS ONCE
Qty: 0 | Refills: 0 | Status: COMPLETED | OUTPATIENT
Start: 2018-07-23 | End: 2018-07-23

## 2018-07-23 RX ORDER — AMITRIPTYLINE HCL 25 MG
25 TABLET ORAL DAILY
Qty: 0 | Refills: 0 | Status: DISCONTINUED | OUTPATIENT
Start: 2018-07-23 | End: 2018-07-24

## 2018-07-23 RX ORDER — ASPIRIN/CALCIUM CARB/MAGNESIUM 324 MG
81 TABLET ORAL DAILY
Qty: 0 | Refills: 0 | Status: DISCONTINUED | OUTPATIENT
Start: 2018-07-23 | End: 2018-07-25

## 2018-07-23 RX ADMIN — SODIUM CHLORIDE 1000 MILLILITER(S): 9 INJECTION INTRAMUSCULAR; INTRAVENOUS; SUBCUTANEOUS at 16:49

## 2018-07-23 NOTE — H&P ADULT - PROBLEM SELECTOR PLAN 4
c/w lipitor  f/u Lipid profile BP stable  starting on amlodipine.   holding enalapril as it may cause hyponatremia further  Putting on Regular diet due to hyponatremia

## 2018-07-23 NOTE — ED PROVIDER NOTE - OBJECTIVE STATEMENT
82 y/o F with PMHx of HLD, HTN and no significant PSHx presents to the ED with complaints of headache and fatigue. As per patient's granddaughter, patient with increasing fatigue over last two months with associated discomfort in the back of the head. Granddaughter reports patient has had episodes of fatigue, pallor and stumbling when walking. Patient denies nausea, vomiting, abdominal pain or any other complaints. NKDA. 82 y/o F with PMHx of HLD, HTN and no significant PSHx presents to the ED with complaints of headache and fatigue. As per patient's granddaughter, patient with increasing fatigue over last two months with associated discomfort in the back of the head. Granddaughter reports patient has had episodes of fatigue, pallor and stumbling when walking. Patient denies nausea, vomiting, abdominal pain or any other complaints. Allergies: Lopid.

## 2018-07-23 NOTE — H&P ADULT - PROBLEM SELECTOR PLAN 5
RISK                                                          Points  [  ] Previous VTE                                                3  [  ] Thrombophilia                                             2  [  ] Lower limb paralysis                                   2        (unable to hold up >15 seconds)    [  ] Current Cancer                                             2         (within 6 months)  [ x ] Immobilization > 24 hrs                              1  [  ] ICU/CCU stay > 24 hours                             1  [ x ] Age > 60                                                         1    IMPROVE VTE Score: 2  lovenox for VTE prophylaxis. c/w lipitor  f/u Lipid profile

## 2018-07-23 NOTE — H&P ADULT - HISTORY OF PRESENT ILLNESS
83 yr old F from home lives with sister, walks with assistance, AXO3, Yoruba speaking used  ID: [id number]/ [id number] with PMH of COPD, Htn, HLD, Arrythmia, Carotid Stenosis, gastritis, chronic hyponatremia ( baseline 133 -137) came with complain of generalized weakness, fatigue x 1 month. Patient states she is having generalized weakness which is progressively getting worse that now she barely walk. Her Fatigue is associated with anorexia, dyspnea, palpitations, cold intolerance, constipation, changes in her weight, and intermittent palpitations and lightheadedness especially when she walks.  She denies fever, chills,  nausea, vomiting, urinary or bowel complains. She never had colonoscopy/ endoscopy in past.     In ED, Patient's vitals signs were stable, labs were significant for H/H of 11.3, Serum sodium of 127, UA negative, CT head normal. Patient was given IV NS bolus. When patient was seen by me she was AXO 3, was lying comfortably 83 yr old F from home lives with sister, walks with assistance, AXO3, Tajik speaking used  ID: [id number]/ [id number] former smoker, with PMH of COPD, Htn, HLD, Arrythmia, Carotid Stenosis, gastritis, chronic hyponatremia ( baseline 133 -137), depression ( as per previous documentation on TCA) came with complain of generalized weakness, fatigue x 1 month. Patient states she is having generalized weakness which is progressively getting worse that now she barely walk. Her Fatigue is associated with anorexia, dyspnea, palpitations, cold intolerance, constipation, changes in her weight, and intermittent palpitations and lightheadedness especially when she walks.  She denies fever, chills,  nausea, vomiting, urinary or bowel complains. She never had colonoscopy/ endoscopy in past.     In ED, Patient's vitals signs were stable, labs were significant for H/H of 11.3, Serum sodium of 127, UA negative, CT head normal. Patient was given IV NS bolus. When patient was seen by me she was AXO 3, was lying comfortably, EKG shows Sinus bradycardia @ 57 with Left bundle branch block 83 yr old F from home lives with sister, walks with assistance, AXO3, English speaking used  ID: [id number]/ [id number] former smoker, with PMH of COPD, Htn, HLD, Arrythmia, Carotid Stenosis, gastritis, chronic hyponatremia ( baseline 133 -137), depression ( as per previous documentation on TCA) came with complain of generalized weakness, fatigue x 1 month.   Patient states she is having generalized weakness which is progressively getting worse that now she barely walk. Her Fatigue is associated with anorexia, dyspnea, palpitations, cold intolerance, constipation, changes in her weight, and intermittent palpitations and lightheadedness especially when she walks.  She denies fever, chills,  nausea, vomiting, urinary or bowel complains. She never had colonoscopy/ endoscopy in past.     In ED, Patient's vitals signs were stable, labs were significant for H/H of 11.3, Serum sodium of 127, UA negative, CT head normal. Patient was given IV NS bolus. When patient was seen by me she was AXO 3, was lying comfortably, EKG shows Sinus bradycardia @ 57 with Left bundle branch block

## 2018-07-23 NOTE — H&P ADULT - ASSESSMENT
83 yr old F from home lives with sister, walks with assistance, AXO3, Welsh speaking used  ID: [id number]/ [id number] former smoker, with PMH of COPD, Htn, HLD, Arrythmia, Carotid Stenosis, gastritis, chronic hyponatremia ( baseline 133 -137) came with complain of generalized weakness, fatigue x 1 month.     In ED, Patient's vitals signs were stable, labs were significant for H/H of 11.3, Serum sodium of 127, UA negative, CT head normal.  EKG shows Sinus bradycardia @ 57 with Left bundle branch block

## 2018-07-23 NOTE — H&P ADULT - PROBLEM SELECTOR PLAN 1
Patient presented with generalized weakness, fatigue, inability to walk, cold intolerance, constipation, dry skin could be multifactorial likely due to Hypothyroidism/ Hyperthyroidism ( As per previous results TSH were low}, depression, vitamin B12 deficiency, underlying malignancy or hyponatremia as patient is thin, albumin below normal.  F/u TSH, B12, Folate, lipid profile  started TCA for depression  PT consult Patient presented with generalized weakness, fatigue, inability to walk, cold intolerance, constipation, dry skin could be multifactorial likely due to Hypothyroidism/ Hyperthyroidism ( As per previous results TSH were low}, depression, vitamin B12 deficiency, underlying malignancy, hyponatremia, adrenal insufficiency or hyponatremia as patient is thin, albumin below normal.  F/u TSH, B12, Folate, lipid profile, cortisol AM  started TCA for depression  PT consult Patient presented with generalized weakness, fatigue, inability to walk, cold intolerance, constipation, dry skin could be multifactorial likely due to hyponatremia, less likely, Hypothyroidism/ Hyperthyroidism ( As per previous results TSH were low}, depression, vitamin B12 deficiency, underlying malignancy, hyponatremia, adrenal insufficiency or hyponatremia as patient is thin, albumin below normal.  F/u TSH, B12, Folate, lipid profile, cortisol AM  started TCA for depression  PT consult

## 2018-07-23 NOTE — H&P ADULT - PROBLEM SELECTOR PLAN 3
BP stable  starting on amlodipine.   holding enalapril as it may cause hyponatremia further  Putting on Regular diet due to hyponatremia Patient sodium is 127 baseline is 137-136, had also previous history of hyponatremia.   Urine electrolytes is giving picture of either hypothyroidism/ adrenal insufficiency/ or SIADH  follow up AM cortisol, TSH free T4, T3.   S/P NS bolus.   Not started on IVF currently  Monitor BMP if sodium goes further low start salt tab,  starting regular diet due to low sodium

## 2018-07-23 NOTE — H&P ADULT - PROBLEM SELECTOR PLAN 2
Patient sodium is 127 baseline is 137-136, had also previous history of hyponatremia.   Urine electrolytes is giving picture of either hypothyroidism/ adrenal insufficiency/ or SIADH  follow up AM cortisol, TSH free T4, T3.   S/P NS bolus.   Not started on IVF currently  Monitor BMP if sodium goes further low start salt tab,  starting regular diet due to low sodium Patient mild anemia of hemoglobin of 11, with normal MCV, may be chronic anemia of disease  F/u anemia panel

## 2018-07-23 NOTE — H&P ADULT - NSHPPHYSICALEXAM_GEN_ALL_CORE
ICU Vital Signs Last 24 Hrs  T(C): 36.9 (23 Jul 2018 16:00), Max: 36.9 (23 Jul 2018 16:00)  T(F): 98.5 (23 Jul 2018 16:00), Max: 98.5 (23 Jul 2018 16:00)  HR: 60 (23 Jul 2018 16:00) (60 - 60)  BP: 145/66 (23 Jul 2018 16:00) (145/66 - 145/66)  BP(mean): --  ABP: --  ABP(mean): --  RR: 18 (23 Jul 2018 16:00) (18 - 18)  SpO2: 97% (23 Jul 2018 16:00) (97% - 97%)    PHYSICAL EXAM:  GENERAL: NAD, obese  HEAD:  Atraumatic, Normocephalic  EYES:  conjunctiva and sclera clear  NECK: Supple, No JVD, Normal thyroid  CHEST/LUNG: Clear to auscultation. Clear to percussion bilaterally; No rales, rhonchi, wheezing, or rubs  HEART: Regular rate and rhythm; No murmurs, rubs, or gallops  ABDOMEN: Soft, Nontender, Nondistended; Bowel sounds present  NERVOUS SYSTEM:  Alert & Oriented X3,    EXTREMITIES:  2+ Peripheral Pulses, No clubbing, cyanosis, or edema  SKIN: warm dry ICU Vital Signs Last 24 Hrs  T(C): 36.9 (23 Jul 2018 16:00), Max: 36.9 (23 Jul 2018 16:00)  T(F): 98.5 (23 Jul 2018 16:00), Max: 98.5 (23 Jul 2018 16:00)  HR: 60 (23 Jul 2018 16:00) (60 - 60)  BP: 145/66 (23 Jul 2018 16:00) (145/66 - 145/66)  BP(mean): --  ABP: --  ABP(mean): --  RR: 18 (23 Jul 2018 16:00) (18 - 18)  SpO2: 97% (23 Jul 2018 16:00) (97% - 97%)    PHYSICAL EXAM:  GENERAL: NAD, thin lean lady  HEAD:  Atraumatic, Normocephalic  EYES:  conjunctiva and sclera clear  NECK: Supple, No JVD, Normal thyroid  CHEST/LUNG: Clear to auscultation. Clear to percussion bilaterally; No rales, rhonchi, wheezing, or rubs  HEART: Regular rate and rhythm; No murmurs, rubs, or gallops  ABDOMEN: Soft, Nontender, Nondistended; Bowel sounds present  NERVOUS SYSTEM:  Alert & Oriented X3,    EXTREMITIES:  2+ Peripheral Pulses, No clubbing, cyanosis, or edema  SKIN: warm dry

## 2018-07-23 NOTE — ED PROVIDER NOTE - MEDICAL DECISION MAKING DETAILS
82 y/o F presents with headache and fatigue. Will obtain labs, IV fluids, CT head, EKG and reassess.

## 2018-07-24 LAB
ALBUMIN SERPL ELPH-MCNC: 3.9 G/DL — SIGNIFICANT CHANGE UP (ref 3.5–5)
ALP SERPL-CCNC: 67 U/L — SIGNIFICANT CHANGE UP (ref 40–120)
ALT FLD-CCNC: 26 U/L DA — SIGNIFICANT CHANGE UP (ref 10–60)
ANION GAP SERPL CALC-SCNC: 8 MMOL/L — SIGNIFICANT CHANGE UP (ref 5–17)
ANION GAP SERPL CALC-SCNC: 8 MMOL/L — SIGNIFICANT CHANGE UP (ref 5–17)
AST SERPL-CCNC: 32 U/L — SIGNIFICANT CHANGE UP (ref 10–40)
BASOPHILS # BLD AUTO: 0 K/UL — SIGNIFICANT CHANGE UP (ref 0–0.2)
BASOPHILS NFR BLD AUTO: 0.5 % — SIGNIFICANT CHANGE UP (ref 0–2)
BILIRUB SERPL-MCNC: 0.4 MG/DL — SIGNIFICANT CHANGE UP (ref 0.2–1.2)
BUN SERPL-MCNC: 14 MG/DL — SIGNIFICANT CHANGE UP (ref 7–18)
BUN SERPL-MCNC: 15 MG/DL — SIGNIFICANT CHANGE UP (ref 7–18)
CALCIUM SERPL-MCNC: 8.5 MG/DL — SIGNIFICANT CHANGE UP (ref 8.4–10.5)
CALCIUM SERPL-MCNC: 9.1 MG/DL — SIGNIFICANT CHANGE UP (ref 8.4–10.5)
CHLORIDE SERPL-SCNC: 104 MMOL/L — SIGNIFICANT CHANGE UP (ref 96–108)
CHLORIDE SERPL-SCNC: 104 MMOL/L — SIGNIFICANT CHANGE UP (ref 96–108)
CHOLEST SERPL-MCNC: 164 MG/DL — SIGNIFICANT CHANGE UP (ref 10–199)
CO2 SERPL-SCNC: 23 MMOL/L — SIGNIFICANT CHANGE UP (ref 22–31)
CO2 SERPL-SCNC: 24 MMOL/L — SIGNIFICANT CHANGE UP (ref 22–31)
CORTIS AM PEAK SERPL-MCNC: 19.1 UG/DL — HIGH (ref 6–18.4)
CREAT SERPL-MCNC: 0.96 MG/DL — SIGNIFICANT CHANGE UP (ref 0.5–1.3)
CREAT SERPL-MCNC: 1.06 MG/DL — SIGNIFICANT CHANGE UP (ref 0.5–1.3)
CULTURE RESULTS: NO GROWTH — SIGNIFICANT CHANGE UP
EOSINOPHIL # BLD AUTO: 0.1 K/UL — SIGNIFICANT CHANGE UP (ref 0–0.5)
EOSINOPHIL NFR BLD AUTO: 1.6 % — SIGNIFICANT CHANGE UP (ref 0–6)
FERRITIN SERPL-MCNC: 31 NG/ML — SIGNIFICANT CHANGE UP (ref 15–150)
FOLATE SERPL-MCNC: 13.3 NG/ML — SIGNIFICANT CHANGE UP
GLUCOSE SERPL-MCNC: 103 MG/DL — HIGH (ref 70–99)
GLUCOSE SERPL-MCNC: 89 MG/DL — SIGNIFICANT CHANGE UP (ref 70–99)
HBA1C BLD-MCNC: 6 % — HIGH (ref 4–5.6)
HCT VFR BLD CALC: 37.1 % — SIGNIFICANT CHANGE UP (ref 34.5–45)
HDLC SERPL-MCNC: 84 MG/DL — SIGNIFICANT CHANGE UP (ref 40–125)
HGB BLD-MCNC: 12.1 G/DL — SIGNIFICANT CHANGE UP (ref 11.5–15.5)
IRON SATN MFR SERPL: 19 % — SIGNIFICANT CHANGE UP (ref 15–50)
IRON SATN MFR SERPL: 80 UG/DL — SIGNIFICANT CHANGE UP (ref 40–150)
LIPID PNL WITH DIRECT LDL SERPL: 72 MG/DL — SIGNIFICANT CHANGE UP
LYMPHOCYTES # BLD AUTO: 1.6 K/UL — SIGNIFICANT CHANGE UP (ref 1–3.3)
LYMPHOCYTES # BLD AUTO: 32.1 % — SIGNIFICANT CHANGE UP (ref 13–44)
MAGNESIUM SERPL-MCNC: 2.4 MG/DL — SIGNIFICANT CHANGE UP (ref 1.6–2.6)
MCHC RBC-ENTMCNC: 30.3 PG — SIGNIFICANT CHANGE UP (ref 27–34)
MCHC RBC-ENTMCNC: 32.7 GM/DL — SIGNIFICANT CHANGE UP (ref 32–36)
MCV RBC AUTO: 92.5 FL — SIGNIFICANT CHANGE UP (ref 80–100)
MONOCYTES # BLD AUTO: 0.6 K/UL — SIGNIFICANT CHANGE UP (ref 0–0.9)
MONOCYTES NFR BLD AUTO: 11.7 % — SIGNIFICANT CHANGE UP (ref 2–14)
NEUTROPHILS # BLD AUTO: 2.6 K/UL — SIGNIFICANT CHANGE UP (ref 1.8–7.4)
NEUTROPHILS NFR BLD AUTO: 54.1 % — SIGNIFICANT CHANGE UP (ref 43–77)
OSMOLALITY SERPL: 278 MOS/KG — SIGNIFICANT CHANGE UP (ref 275–300)
PHOSPHATE SERPL-MCNC: 2.9 MG/DL — SIGNIFICANT CHANGE UP (ref 2.5–4.5)
PLATELET # BLD AUTO: 233 K/UL — SIGNIFICANT CHANGE UP (ref 150–400)
POTASSIUM SERPL-MCNC: 4.1 MMOL/L — SIGNIFICANT CHANGE UP (ref 3.5–5.3)
POTASSIUM SERPL-MCNC: 4.2 MMOL/L — SIGNIFICANT CHANGE UP (ref 3.5–5.3)
POTASSIUM SERPL-SCNC: 4.1 MMOL/L — SIGNIFICANT CHANGE UP (ref 3.5–5.3)
POTASSIUM SERPL-SCNC: 4.2 MMOL/L — SIGNIFICANT CHANGE UP (ref 3.5–5.3)
PROT SERPL-MCNC: 7.7 G/DL — SIGNIFICANT CHANGE UP (ref 6–8.3)
RBC # BLD: 4.01 M/UL — SIGNIFICANT CHANGE UP (ref 3.8–5.2)
RBC # FLD: 13 % — SIGNIFICANT CHANGE UP (ref 10.3–14.5)
SODIUM SERPL-SCNC: 135 MMOL/L — SIGNIFICANT CHANGE UP (ref 135–145)
SODIUM SERPL-SCNC: 136 MMOL/L — SIGNIFICANT CHANGE UP (ref 135–145)
SPECIMEN SOURCE: SIGNIFICANT CHANGE UP
T3FREE SERPL-MCNC: 2.54 PG/ML — SIGNIFICANT CHANGE UP (ref 1.8–4.6)
T4 FREE SERPL-MCNC: 1.4 NG/DL — SIGNIFICANT CHANGE UP (ref 0.9–1.8)
TIBC SERPL-MCNC: 420 UG/DL — SIGNIFICANT CHANGE UP (ref 250–450)
TOTAL CHOLESTEROL/HDL RATIO MEASUREMENT: 2 RATIO — LOW (ref 3.3–7.1)
TRANSFERRIN SERPL-MCNC: 302 MG/DL — SIGNIFICANT CHANGE UP (ref 200–360)
TRIGL SERPL-MCNC: 40 MG/DL — SIGNIFICANT CHANGE UP (ref 10–149)
UIBC SERPL-MCNC: 340 UG/DL — SIGNIFICANT CHANGE UP (ref 110–370)
VIT B12 SERPL-MCNC: 1046 PG/ML — SIGNIFICANT CHANGE UP (ref 232–1245)
WBC # BLD: 4.8 K/UL — SIGNIFICANT CHANGE UP (ref 3.8–10.5)
WBC # FLD AUTO: 4.8 K/UL — SIGNIFICANT CHANGE UP (ref 3.8–10.5)

## 2018-07-24 RX ORDER — MIRTAZAPINE 45 MG/1
7.5 TABLET, ORALLY DISINTEGRATING ORAL AT BEDTIME
Qty: 0 | Refills: 0 | Status: DISCONTINUED | OUTPATIENT
Start: 2018-07-24 | End: 2018-07-25

## 2018-07-24 RX ADMIN — Medication 25 MILLIGRAM(S): at 12:09

## 2018-07-24 RX ADMIN — MIRTAZAPINE 7.5 MILLIGRAM(S): 45 TABLET, ORALLY DISINTEGRATING ORAL at 22:03

## 2018-07-24 RX ADMIN — AMLODIPINE BESYLATE 5 MILLIGRAM(S): 2.5 TABLET ORAL at 05:59

## 2018-07-24 RX ADMIN — ATORVASTATIN CALCIUM 40 MILLIGRAM(S): 80 TABLET, FILM COATED ORAL at 02:57

## 2018-07-24 RX ADMIN — ATORVASTATIN CALCIUM 40 MILLIGRAM(S): 80 TABLET, FILM COATED ORAL at 22:03

## 2018-07-24 RX ADMIN — Medication 81 MILLIGRAM(S): at 12:09

## 2018-07-24 RX ADMIN — ENOXAPARIN SODIUM 30 MILLIGRAM(S): 100 INJECTION SUBCUTANEOUS at 12:09

## 2018-07-24 NOTE — PROGRESS NOTE ADULT - PROBLEM SELECTOR PLAN 4
BP stable  starting on amlodipine.   holding enalapril as it may cause hyponatremia further  Putting on Regular diet due to hyponatremia

## 2018-07-24 NOTE — PROGRESS NOTE ADULT - PROBLEM SELECTOR PLAN 3
Patient admitted with Na: 127  Serum osmolality :278  Amitriptyline could be the cause: discontinued  Follow up with cortisol, TSH  Repeat Na: 135  Follow up with BMP,

## 2018-07-24 NOTE — PROGRESS NOTE ADULT - PROBLEM SELECTOR PLAN 2
Patient mild anemia of hemoglobin of 11, with normal MCV, may be chronic anemia of disease  F/u anemia panel

## 2018-07-25 VITALS
HEART RATE: 47 BPM | DIASTOLIC BLOOD PRESSURE: 47 MMHG | SYSTOLIC BLOOD PRESSURE: 133 MMHG | RESPIRATION RATE: 16 BRPM | OXYGEN SATURATION: 100 % | TEMPERATURE: 97 F

## 2018-07-25 LAB
ANION GAP SERPL CALC-SCNC: 7 MMOL/L — SIGNIFICANT CHANGE UP (ref 5–17)
BUN SERPL-MCNC: 16 MG/DL — SIGNIFICANT CHANGE UP (ref 7–18)
CALCIUM SERPL-MCNC: 8.7 MG/DL — SIGNIFICANT CHANGE UP (ref 8.4–10.5)
CHLORIDE SERPL-SCNC: 104 MMOL/L — SIGNIFICANT CHANGE UP (ref 96–108)
CO2 SERPL-SCNC: 24 MMOL/L — SIGNIFICANT CHANGE UP (ref 22–31)
CREAT SERPL-MCNC: 0.96 MG/DL — SIGNIFICANT CHANGE UP (ref 0.5–1.3)
GLUCOSE SERPL-MCNC: 72 MG/DL — SIGNIFICANT CHANGE UP (ref 70–99)
POTASSIUM SERPL-MCNC: 4.1 MMOL/L — SIGNIFICANT CHANGE UP (ref 3.5–5.3)
POTASSIUM SERPL-SCNC: 4.1 MMOL/L — SIGNIFICANT CHANGE UP (ref 3.5–5.3)
SODIUM SERPL-SCNC: 135 MMOL/L — SIGNIFICANT CHANGE UP (ref 135–145)
TSH SERPL-MCNC: 0.95 UU/ML — SIGNIFICANT CHANGE UP (ref 0.34–4.82)

## 2018-07-25 PROCEDURE — 84156 ASSAY OF PROTEIN URINE: CPT

## 2018-07-25 PROCEDURE — 84439 ASSAY OF FREE THYROXINE: CPT

## 2018-07-25 PROCEDURE — 82533 TOTAL CORTISOL: CPT

## 2018-07-25 PROCEDURE — 84133 ASSAY OF URINE POTASSIUM: CPT

## 2018-07-25 PROCEDURE — 81003 URINALYSIS AUTO W/O SCOPE: CPT

## 2018-07-25 PROCEDURE — 82570 ASSAY OF URINE CREATININE: CPT

## 2018-07-25 PROCEDURE — 85027 COMPLETE CBC AUTOMATED: CPT

## 2018-07-25 PROCEDURE — 70450 CT HEAD/BRAIN W/O DYE: CPT

## 2018-07-25 PROCEDURE — 84300 ASSAY OF URINE SODIUM: CPT

## 2018-07-25 PROCEDURE — 80061 LIPID PANEL: CPT

## 2018-07-25 PROCEDURE — 82728 ASSAY OF FERRITIN: CPT

## 2018-07-25 PROCEDURE — 84443 ASSAY THYROID STIM HORMONE: CPT

## 2018-07-25 PROCEDURE — 97161 PT EVAL LOW COMPLEX 20 MIN: CPT

## 2018-07-25 PROCEDURE — 83550 IRON BINDING TEST: CPT

## 2018-07-25 PROCEDURE — 84466 ASSAY OF TRANSFERRIN: CPT

## 2018-07-25 PROCEDURE — 93005 ELECTROCARDIOGRAM TRACING: CPT

## 2018-07-25 PROCEDURE — 80053 COMPREHEN METABOLIC PANEL: CPT

## 2018-07-25 PROCEDURE — 80048 BASIC METABOLIC PNL TOTAL CA: CPT

## 2018-07-25 PROCEDURE — 82746 ASSAY OF FOLIC ACID SERUM: CPT

## 2018-07-25 PROCEDURE — 99285 EMERGENCY DEPT VISIT HI MDM: CPT | Mod: 25

## 2018-07-25 PROCEDURE — 84100 ASSAY OF PHOSPHORUS: CPT

## 2018-07-25 PROCEDURE — 87086 URINE CULTURE/COLONY COUNT: CPT

## 2018-07-25 PROCEDURE — 82607 VITAMIN B-12: CPT

## 2018-07-25 PROCEDURE — 83735 ASSAY OF MAGNESIUM: CPT

## 2018-07-25 PROCEDURE — 83935 ASSAY OF URINE OSMOLALITY: CPT

## 2018-07-25 PROCEDURE — 84481 FREE ASSAY (FT-3): CPT

## 2018-07-25 PROCEDURE — 83930 ASSAY OF BLOOD OSMOLALITY: CPT

## 2018-07-25 PROCEDURE — 83036 HEMOGLOBIN GLYCOSYLATED A1C: CPT

## 2018-07-25 RX ORDER — MIRTAZAPINE 45 MG/1
1 TABLET, ORALLY DISINTEGRATING ORAL
Qty: 30 | Refills: 0 | OUTPATIENT
Start: 2018-07-25 | End: 2018-08-23

## 2018-07-25 RX ADMIN — Medication 81 MILLIGRAM(S): at 11:10

## 2018-07-25 RX ADMIN — AMLODIPINE BESYLATE 5 MILLIGRAM(S): 2.5 TABLET ORAL at 05:59

## 2018-07-25 RX ADMIN — ENOXAPARIN SODIUM 30 MILLIGRAM(S): 100 INJECTION SUBCUTANEOUS at 11:10

## 2018-07-25 NOTE — DISCHARGE NOTE ADULT - CARE PLAN
Principal Discharge DX:	Fatigue  Secondary Diagnosis:	Hyponatremia  Secondary Diagnosis:	Hypertension  Secondary Diagnosis:	Hypercholesteremia Principal Discharge DX:	Fatigue  Goal:	no fatigue  Secondary Diagnosis:	Hyponatremia  Secondary Diagnosis:	Hypertension  Secondary Diagnosis:	Hypercholesteremia Principal Discharge DX:	Fatigue  Goal:	no fatigue  Assessment and plan of treatment:	You presented with Na: 127 and generalized weakness.   For hyponatremia, patient was conservatively managed with IV fluid. S. osmolality was 278, urine osmolality: 126, Urine Na: 25, TSH: 0.95, cortisol AM: 19.1. BMP was followed, repeat Na: 135-->136-->135. Your symptoms improved.   You are recommended to take medications as prescribed and follow up with PCP in 1 week  Secondary Diagnosis:	Hyponatremia  Assessment and plan of treatment:	Managed as above  Secondary Diagnosis:	Hypertension  Assessment and plan of treatment:	DASH Diet, exercise, take medication as prescribed.   Avoid Enalapril because of chronic hyponatremia  Follow up with PCP in 1 week  Secondary Diagnosis:	Hypercholesteremia  Assessment and plan of treatment:	Low carb diet, take medications as advised and follow up with PCP in 1 week  Secondary Diagnosis:	Bradycardia  Assessment and plan of treatment:	you have asymptomatic bradycardia, with HR in 50s. You are recommended to follow up with PCP in 1 week.

## 2018-07-25 NOTE — DISCHARGE NOTE ADULT - HOSPITAL COURSE
83 yr old F from home lives with sister, walks with assistance, [employer], Azeri speaking used  ID: [id number]/ [id number] former smoker, with PMH of COPD, Htn, HLD, Arrythmia, Carotid Stenosis, gastritis, chronic hyponatremia ( baseline 133 -137) came with complain of generalized weakness, fatigue x 1 month.     In ED, Patient's vitals signs were stable, labs were significant for H/H of 11.3, Serum sodium of 127, UA negative, CT head normal.  EKG shows Sinus bradycardia @ 57 with Left bundle branch block     Problem/Plan - 1:  ·  Problem: Fatigue.  Plan: Patient presented with generalized weakness, fatigue, inability to walk, cold intolerance, constipation, dry skin could be multifactorial likely due to hyponatremia, less likely, Hypothyroidism/ Hyperthyroidism ( As per previous results TSH were low}, depression, vitamin B12 deficiency, underlying malignancy, hyponatremia, adrenal insufficiency or hyponatremia as patient is thin, albumin below normal.  F/u TSH, B12, Folate, lipid profile, cortisol AM  started TCA for depression  PT consult.     Problem/Plan - 2:  ·  Problem: Anemia.  Plan: Patient mild anemia of hemoglobin of 11, with normal MCV, may be chronic anemia of disease  F/u anemia panel.      Problem/Plan - 3:  ·  Problem: Hyponatremia.  Plan: Patient sodium is 127 baseline is 137-136, had also previous history of hyponatremia.   Urine electrolytes is giving picture of either hypothyroidism/ adrenal insufficiency/ or SIADH  follow up AM cortisol, TSH free T4, T3.   S/P NS bolus.   Not started on IVF currently  Monitor BMP if sodium goes further low start salt tab,  starting regular diet due to low sodium.      Problem/Plan - 4:  ·  Problem: Hypertension.  Plan: BP stable  starting on amlodipine.   holding enalapril as it may cause hyponatremia further  Putting on Regular diet due to hyponatremia.      Problem/Plan - 5:  ·  Problem: Hypercholesteremia.  Plan: c/w lipitor  f/u Lipid profile. 83 yr old F from home lives with sister, walks with assistance, AAOx3, Greenlandic speaking woman, smoker, with PMH of COPD, Htn, HLD, Arrythmia, Carotid Stenosis, gastritis, chronic hyponatremia ( baseline 133 -137) came with complain of generalized weakness, fatigue x 1 month.     In ED, Patient's vitals signs were stable, labs were significant for H/H of 11.3, Serum sodium of 127, UA negative, CT head normal.  EKG shows Sinus bradycardia @ 57 with Left bundle branch block     Problem/Plan - 1:  ·  Problem: Fatigue.  Plan: Patient presented with generalized weakness, fatigue, inability to walk, cold intolerance, constipation, dry skin could be multifactorial likely due to hyponatremia, less likely, Hypothyroidism/ Hyperthyroidism ( As per previous results TSH were low}, depression, vitamin B12 deficiency, underlying malignancy, hyponatremia, adrenal insufficiency or hyponatremia as patient is thin, albumin below normal.  F/u TSH, B12, Folate, lipid profile, cortisol AM  started TCA for depression  PT consult.     Problem/Plan - 2:  ·  Problem: Anemia.  Plan: Patient mild anemia of hemoglobin of 11, with normal MCV, may be chronic anemia of disease  F/u anemia panel.      Problem/Plan - 3:  ·  Problem: Hyponatremia.  Plan: Patient sodium is 127 baseline is 137-136, had also previous history of hyponatremia.   Urine electrolytes is giving picture of either hypothyroidism/ adrenal insufficiency/ or SIADH  follow up AM cortisol, TSH free T4, T3.   S/P NS bolus.   Not started on IVF currently  Monitor BMP if sodium goes further low start salt tab,  starting regular diet due to low sodium.      Problem/Plan - 4:  ·  Problem: Hypertension.  Plan: BP stable  starting on amlodipine.   holding enalapril as it may cause hyponatremia further  Putting on Regular diet due to hyponatremia.      Problem/Plan - 5:  ·  Problem: Hypercholesteremia.  Plan: c/w lipitor  f/u Lipid profile. 83 yr old F from home lives with sister, walks with assistance, AAOx3, Austrian speaking woman, smoker, with PMH of COPD, Htn, HLD, Arrythmia, Carotid Stenosis, gastritis, chronic hyponatremia ( baseline 133 -137) came with complain of generalized weakness, fatigue x 1 month.     In ED, Patient's vitals signs were stable, labs were significant for H/H of 11.3, Serum sodium of 127, UA negative, CT head normal.  EKG shows Sinus bradycardia @ 57 with Left bundle branch block    For hyponatremia, patient was conservatively managed with IV fluid. S. osmolality was 278, urine osmolality: 126, Urine Na: 25, TSH: 0.95, cortisol AM: 19.1. BMP was followed, repeat Na: 135-->136-->135. Albumin: 3.9. Patient fatigue improved.     Pt has  asymptomatic bradycardia, with HR in 50s. You are recommended to follow up with PCP in 1 week.     Patient is medically stable to discharge.

## 2018-07-25 NOTE — DISCHARGE NOTE ADULT - PLAN OF CARE
no fatigue You presented with Na: 127 and generalized weakness.   For hyponatremia, patient was conservatively managed with IV fluid. S. osmolality was 278, urine osmolality: 126, Urine Na: 25, TSH: 0.95, cortisol AM: 19.1. BMP was followed, repeat Na: 135-->136-->135. Your symptoms improved.   You are recommended to take medications as prescribed and follow up with PCP in 1 week Managed as above DASH Diet, exercise, take medication as prescribed.   Avoid Enalapril because of chronic hyponatremia  Follow up with PCP in 1 week Low carb diet, take medications as advised and follow up with PCP in 1 week you have asymptomatic bradycardia, with HR in 50s. You are recommended to follow up with PCP in 1 week.

## 2018-07-25 NOTE — DISCHARGE NOTE ADULT - MEDICATION SUMMARY - MEDICATIONS TO STOP TAKING
I will STOP taking the medications listed below when I get home from the hospital:    amitriptyline 25 mg oral tablet  -- 1 tab(s) by mouth once a day I will STOP taking the medications listed below when I get home from the hospital:    enalapril 20 mg oral tablet  -- 1 tab(s) by mouth once a day    amitriptyline 25 mg oral tablet  -- 1 tab(s) by mouth once a day

## 2018-07-25 NOTE — DISCHARGE NOTE ADULT - MEDICATION SUMMARY - MEDICATIONS TO TAKE
I will START or STAY ON the medications listed below when I get home from the hospital:    aspirin  -- 100 milligram(s) by mouth once a day  -- Indication: For Anti platelet    enalapril 20 mg oral tablet  -- 1 tab(s) by mouth once a day  -- Indication: For HTN    mirtazapine 7.5 mg oral tablet  -- 1 tab(s) by mouth once a day (at bedtime)  -- Indication: For Appetite    meclizine 12.5 mg oral tablet  -- 1 tab(s) by mouth every 8 hours, As needed, Dizziness  -- Indication: For VErtigo    Crestor 20 mg oral tablet  -- 1 tab(s) by mouth once a day (at bedtime)  -- Indication: For HLD     amLODIPine  -- 5 milligram(s) by mouth once a day  -- Indication: For HTN I will START or STAY ON the medications listed below when I get home from the hospital:    aspirin  -- 100 milligram(s) by mouth once a day  -- Indication: For Anti platelet    mirtazapine 7.5 mg oral tablet  -- 1 tab(s) by mouth once a day (at bedtime)  -- Indication: For Appetite stimulant    meclizine 12.5 mg oral tablet  -- 1 tab(s) by mouth every 8 hours, As needed, Dizziness  -- Indication: For VErtigo    Crestor 20 mg oral tablet  -- 1 tab(s) by mouth once a day (at bedtime)  -- Indication: For HLD     amLODIPine  -- 5 milligram(s) by mouth once a day  -- Indication: For HTN

## 2018-07-25 NOTE — DISCHARGE NOTE ADULT - PATIENT PORTAL LINK FT
You can access the "[employer] Patient Portal, offered by [facility], by registering with the following website: [email or web address]

## 2018-07-25 NOTE — PHYSICAL THERAPY INITIAL EVALUATION ADULT - GENERAL OBSERVATIONS, REHAB EVAL
Consult received, chart reviewed. Patient received supine in bed, NAD. Patient agreed to EVALUATION from Physical Therapist. Daughter bedside. Free interp. services offered (#[id number]) and deferred and pt. elected daughter to assist with translation

## 2018-07-25 NOTE — DISCHARGE NOTE ADULT - CARE PROVIDER_API CALL
Roland Mcclellan (DO), Internal Medicine  [address] [place] [address]  Phone: [phone]  Fax: [phone] Roland Mcclellan (DO), Internal Medicine  [address] [place] [address]  Phone: [phone]  Fax: [phone]

## 2021-04-06 NOTE — PHYSICAL THERAPY INITIAL EVALUATION ADULT - TRANSFER SKILLS, REHAB EVAL
independent
pt requesting covid test required for upcoming travel. denies any sxs. no fever/chills. no chest pain, sob, cough, sore throat, myalgias.  no rhinorhea, loss of smell/taste. no covid+ contacts or travel.

## 2021-06-30 NOTE — PATIENT PROFILE ADULT. - TEACHING/LEARNING LEARNING PREFERENCES
Yoli Vogel  : 1945  Primary:   Secondary:  [address] at [place] [id number] [facility], [address] [place] [address],[address], [facility].  [phone]   [phone]         OUTPATIENT PHYSICAL THERAPY: Daily Treatment Note 2021  ICD-10: Treatment Diagnosis: Cervicalgia (M54.2)  Pre-treatment Symptoms/Complaints:  2021: Patient reports she has been doing more so her shoulder and neck are very tight and painful today. Pain: Initial: Pain Intensity 1: 4  Pain Location 1: Neck, Shoulder  Pain Orientation 1: Right, Left  Pain Intervention(s) 1: Rest, Medication (see MAR)  Post Session:  3/10   Medications Last Reviewed:  2021   Updated Objective Findings:  None Today   TREATMENT:   MANUAL THERAPY: (40 minutes): Joint mobilization and Soft tissue mobilization was utilized and necessary because of the patient's restricted joint motion, painful spasm, loss of articular motion and restricted motion of soft tissue. Treatment/Session Summary:    · Response to Treatment:  Patient tolerated treatment well with improving overall mobility after treatment. · Communication/Consultation:  None today  · Equipment provided today:  None today  · Recommendations/Intent for next treatment session: Next visit will focus on improving overall mobility with decreased pain. Treatment Plan of Care Effective Dates:  2021 TO 2021 (90 days).    Total Treatment Billable Duration:  40 minutes  PT Patient Time In/Time Out  Time In: 0845  Time Out: [facility]    Future Appointments   Date Time Provider Radha Bautista   2021  8:45 AM Selvin Alvarado PT [facility]   2021  8:45 AM Selvin Alvarado PT [facility]   2021  8:45 AM Selvin Alvarado PT [facility]   2021  1:40 PM Obdulia Grajeda MD [facility]
verbal instruction

## 2022-03-23 NOTE — PHYSICAL THERAPY INITIAL EVALUATION ADULT - PATIENT/FAMILY AGREES WITH PLAN
Size Of Lesion: 6mm Morphology Per Location (Optional): Slightly atypical Detail Level: Detailed Size Of Lesion: 4mm Morphology Per Location (Optional): Dark nevus Morphology Per Location (Optional): Slightly erythematous patch with 1mm dark center Size Of Lesion: 7mm Instructions (Optional): Discussed applying Aquaphor 2-3x a day for itch\\nRTC for bx if itching does not resolve Size Of Lesion: 1.3cm yes/Home with home PT Size Of Lesion: 3mm

## 2022-04-18 NOTE — H&P ADULT. - PROBLEM SELECTOR PLAN 4
Subjective Finding:    Chief compalint: Patient presents with:  Back Pain  , Pain Scale: 7/10, Intensity: sharp, Duration: 3 days, Radiating: no.    Date of injury:     Activities that the pain restricts:   Home/household/hobbies/social activities: yes.  Work duties: yes.  Sleep: yes.  Makes symptoms better: rest.  Makes symptoms worse: lumbar flexion.  Have you seen anyone else for the symptoms? No.  Work related: no.  Automobile related injury: no.    Objective and Assessment:    Posture Analysis:   High shoulder: .  Head tilt: .  High iliac crest: .  Head carriage: neutral.  Thoracic Kyphosis: neutral.  Lumbar Lordosis: forward.    Lumbar Range of Motion: extension decreased.  Cervical Range of Motion: .  Thoracic Range of Motion: .  Extremity Range of Motion: .    Palpation:   Trap pain    Segmental dysfunction pre-treatment and treatment area: T7, L5 and Sacrum.  C56    Assessment post-treatment:  Cervical: .  Thoracic: ROM increased.  Lumbar: ROM increased.    Comments: .      Complicating Factors: .    Procedure(s):  CMT:  20980 Chiropractic manipulative treatment 1-2 regions performed   Thoracic: Diversified, See above for level, Prone and Lumbar: Diversified, See above for level, Side posture    Modalities:  None performed this visit    Therapeutic procedures:  None    Plan:  Treatment plan: PRN.  Instructed patient: stretch as instructed at visit.  Short term goals: increase ROM.  Long term goals: increase ADL.  Prognosis: excellent.             
Improve VTE score of 2 and will continue lovenox for DVT prophylaxis

## 2023-08-02 NOTE — PATIENT PROFILE ADULT. - SPIRITUAL CULTURAL, RELIGIOUS PRACTICES/VALUES, PROFILE
Hemostasis started on the right radial artery. R-Band was used in achieving hemostasis. Radial compression device applied to vessel. Hemostasis achieved successfully. Restorationist

## 2024-07-03 NOTE — ED ADULT NURSE NOTE - ED STAT RN HANDOFF DETAILS
Reviewed: radiology and notes.  Labs: ordered. Decision-making details documented in ED Course.  Radiology: ordered. Decision-making details documented in ED Course.    Risk  OTC drugs.  Prescription drug management.  Decision regarding hospitalization.          The patient was given the followingmedications:  Orders Placed This Encounter   Medications    acetaminophen (TYLENOL) tablet 1,000 mg    lactated ringers bolus 1,000 mL    ondansetron (ZOFRAN) injection 4 mg    ondansetron (ZOFRAN-ODT) 4 MG disintegrating tablet     Sig: Take 1 tablet by mouth 3 times daily as needed for Nausea or Vomiting     Dispense:  21 tablet     Refill:  0       CONSULTS:  None      CRITICAL CARE TIME   Total Critical Care time was 0 minutes, excluding separately reportable procedures in the context of the following condition na.  There was a high probability of clinically significant/life threatening deterioration in the patient's condition which required my urgent intervention.    Clinical Impression     1. Acute cough    2. Nausea and vomiting, unspecified vomiting type    3. Transaminitis        Disposition     PATIENT REFERRED TO:  Brent Ferreira MD  [address] [place] [address] Dr. B[place] [address] [place], [place] [id number]  [phone]    Schedule an appointment as soon as possible for a visit in 1 week        DISCHARGE MEDICATIONS:  Discharge Medication List as of 7/3/2024  5:52 AM        START taking these medications    Details   ondansetron (ZOFRAN-ODT) 4 MG disintegrating tablet Take 1 tablet by mouth 3 times daily as needed for Nausea or Vomiting, Disp-21 tablet, R-0Normal             DISPOSITION Decision To Discharge 07/03/2024 05:47:37 AM      Kennedy Andrews MD (electronically signed)  Attending Emergency Physician    Please note this documentation has been produced using speech recognition software and may contain errors related to that system including errors in grammar, punctuation, and spelling, as well as words and phrases that may be  pt endorsed to felisa Garland.

## 2024-11-05 NOTE — ED ADULT NURSE NOTE - CAS EDN INTEG ASSESS
[Previously active] : previously active [TextBox_4] : Last exam 2021, pap nml, no hx of abn paps. No vb. had lost weight unintentionally (25lbs) has gained back 5 lbs. Due to this dhe was sent for ct of abd/pelvis. Only fibroid uterus noted gynecologically. Bladder thickening noted and she needs to fu with urology.  takes vit d and exercises [Mammogramdate] : 5/2024 [PapSmeardate] : 3/2021 [BoneDensityDate] : long time ago [ColonoscopyDate] : having first one n [facility]

## 2025-04-18 NOTE — DISCHARGE NOTE ADULT - NS AS DC PROVIDER CONTACT Y/N MULTI
[FreeTextEntry1] : good weight gain continue feeds as discussed TCB 12.6 (low risk) recheck at 1 mo visit Yes